# Patient Record
Sex: FEMALE | Race: BLACK OR AFRICAN AMERICAN | Employment: FULL TIME | URBAN - METROPOLITAN AREA
[De-identification: names, ages, dates, MRNs, and addresses within clinical notes are randomized per-mention and may not be internally consistent; named-entity substitution may affect disease eponyms.]

---

## 2020-10-30 ENCOUNTER — TRANSCRIBE ORDER (OUTPATIENT)
Dept: PHYSICAL THERAPY | Age: 50
End: 2020-10-30

## 2020-10-30 DIAGNOSIS — M54.2 CERVICALGIA: Primary | ICD-10-CM

## 2020-11-06 ENCOUNTER — HOSPITAL ENCOUNTER (OUTPATIENT)
Dept: PHYSICAL THERAPY | Age: 50
Discharge: HOME | End: 2020-11-06
Payer: COMMERCIAL

## 2020-11-06 PROCEDURE — 97110 THERAPEUTIC EXERCISES: CPT

## 2020-11-06 PROCEDURE — 97140 MANUAL THERAPY 1/> REGIONS: CPT

## 2020-11-06 PROCEDURE — 97162 PT EVAL MOD COMPLEX 30 MIN: CPT

## 2020-11-06 NOTE — PROGRESS NOTES
PT DAILY TREATMENT NOTE     Patient Name: Thanh Bo  Date:2020  : 1970  [x]  Patient  Verified  Payor: BLUE CROSS / Plan: [facility] PPO / Product Type: PPO /    In time: 434  Out time:515  Total Treatment Time (min): 41  Visit #: 1 of 8    Medicare/BCBS Only   Total Timed Codes (min):  26 1:1 Treatment Time:  41       Treatment Area: Cervicalgia [M54.2]    SUBJECTIVE  Pain Level (0-10 scale): 4  Any medication changes, allergies to medications, adverse drug reactions, diagnosis change, or new procedure performed?: [x] No    [] Yes (see summary sheet for update)  Subjective functional status/changes:   [] No changes reported  Patient reports her neck pain started on 2020 when she dropped something and reached forward to pick it up. She reports a burning sensation through the left cervical musculature and through the left UE. She reports difficulty with carrying objects on the left, \"pressure\" in the left neck, and performance of heavy household activity due to the pain. OBJECTIVE    15 min [x]Eval                  []Re-Eval       18 min Therapeutic Exercise:  [] See flow sheet: Patient provided printed HEP to begin addressing impairments. Demonstration of exercises provided with rationale for each. Patient education provided on importance of compliance to HEP for improved carry over between therapy sessions. Patient education for hot pack application for symptom management. Reviewed sleeping position and use of towel roll for cervical support to improve sleep. Rationale: increase ROM and increase strength to improve the patients ability to perform ADLs and self care tasks with greater ease     8 min Manual Therapy:  Seated - STM/DTM to left UT and levator scap; Supine - SOR, passive side flexion   The manual therapy interventions were performed at a separate and distinct time from the therapeutic activities interventions.   Rationale: decrease pain and increase tissue extensibility to perform overall functional mobility with greater ease           With   [] TE   [] TA   [] neuro   [] other: Patient Education: [x] Review HEP    [] Progressed/Changed HEP based on:   [] positioning   [] body mechanics   [] transfers   [] heat/ice application    [] other:      Other Objective/Functional Measures: see paper chart for evaluation form     Pain Level (0-10 scale) post treatment: 3-4    ASSESSMENT/Changes in Function: Patient is a 48year old female with acute onset of neck pain following episode of bending forward and reaching for object from floor. Patient reports no limitations in functional mobility prior to onset and is currently limited with driving, sleeping, ADL performance, and self care tasks due to neck and left UE pain. Patient presents with decreased cervical range of motion, left UE strength, (-) ULTT, poor postural awareness, no change with cervical traction/compression, and increased hypertonicity through cervical musculature (left > right). Patient would benefit from skilled PT to address impairments and improve overall functional mobility and ADL performance. Patient will continue to benefit from skilled PT services to modify and progress therapeutic interventions, address functional mobility deficits, address ROM deficits, address strength deficits, analyze and address soft tissue restrictions, analyze and cue movement patterns, analyze and modify body mechanics/ergonomics, assess and modify postural abnormalities, address imbalance/dizziness and instruct in home and community integration to attain remaining goals. [x]  See Plan of Care  []  See progress note/recertification  []  See Discharge Summary         Progress towards goals / Updated goals:  Short Term Goals: To be accomplished in 2 weeks:  1.  Patient will report performance of home exercise program 4 of 7 days in the next week demonstrating compliance to therapy program and progress towards independent management of symptoms. Eval: HEP provided     Long Term Goals: To be accomplished in 4 weeks:  1. Patient will report pain <3/10 with full work day showing improvements in functional mobility and return to prior level of function. Eval: 7/10 with full work day  2. Patient will improve left upper extremity strength grossly to 5-/5 for greater ease with lifting/carrying groceries and perform household cleaning such as vacuuming and sweeping. Eval: left UE grossly 4-/5  3. Patient will improve cervical range of motion to [facility] for greater ease with driving and reading. Eval: cervical extension 25 deg, right lateral flex 35 deg, left rot 65 deg  4. Patient will report improvement in headaches by 50% to promote return to prior level of function and increase quality of life. Eval: 0%  5. Patient will improve FOTO score to 64 to demonstrate return to prior level of function and to improve patients ability to perform household activities with greater ease. Eval: 50    PLAN  [x]  Upgrade activities as tolerated     []  Continue plan of care  []  Update interventions per flow sheet       []  Discharge due to:_  []  Other:_      Erasmo Grijalva PT, DPT 11/6/2020  5:19 PM    No future appointments.
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
## 2020-11-19 ENCOUNTER — APPOINTMENT (OUTPATIENT)
Dept: PHYSICAL THERAPY | Age: 50
End: 2020-11-19
Payer: COMMERCIAL

## 2020-11-24 ENCOUNTER — HOSPITAL ENCOUNTER (OUTPATIENT)
Dept: PHYSICAL THERAPY | Age: 50
Discharge: HOME | End: 2020-11-24
Payer: COMMERCIAL

## 2020-11-24 PROCEDURE — 97110 THERAPEUTIC EXERCISES: CPT

## 2020-11-24 PROCEDURE — 97140 MANUAL THERAPY 1/> REGIONS: CPT

## 2020-11-24 PROCEDURE — 97530 THERAPEUTIC ACTIVITIES: CPT

## 2020-11-24 NOTE — PROGRESS NOTES
PT DAILY TREATMENT NOTE     Patient Name: Michelle Hernandez  Date:2020  : 1970  [x]  Patient  Verified  Payor: BLUE CROSS / Plan: [employer] [facility] PPO / Product Type: PPO /    In time:5:15  Out time:6:16  Total Treatment Time (min): 60  Visit #: 2 of 8    Medicare/BCBS Only   Total Timed Codes (min):  60 1:1 Treatment Time:  52       Treatment Area: Cervicalgia [M54.2]    SUBJECTIVE  Pain Level (0-10 scale): 9  Any medication changes, allergies to medications, adverse drug reactions, diagnosis change, or new procedure performed?: [x] No    [] Yes (see summary sheet for update)  Subjective functional status/changes:   [] No changes reported  Pt reports her neck is tight and tense and she can feel numbness running down into the back of her elbow. OBJECTIVE    37 min Therapeutic Exercise:  [x] See flow sheet :   Rationale: increase ROM and increase strength to improve the patients ability to perform functional task with ease. 15 min Therapeutic Activity:  [x]  See flow sheet :   Rationale: increase strength and improve coordination  to improve the patients ability to tolerate ADL's.    8 min Manual Therapy:  Seated - STM/DTM to left UT and levator scap; Supine - SOR, passive side flexion. Right S/L scapular circles and clocks. The manual therapy interventions were performed at a separate and distinct time from the therapeutic activities interventions. Rationale: decrease pain, increase ROM, increase tissue extensibility and decrease trigger points to improve functional mobility with ADL's. With   [] TE   [] TA   [] neuro   [] other: Patient Education: [x] Review HEP    [] Progressed/Changed HEP based on:   [] positioning   [] body mechanics   [] transfers   [] heat/ice application    [] other:      Other Objective/Functional Measures: BP check due to pt reporting some increased anxiety during beginnign of therex.  /86 pulse 93    Pain Level (0-10 scale) post treatment: 1.5    ASSESSMENT/Changes in Function:   First f/u session with pt displaying fair tolerance to therex however BP taken following exercises with the T-bands due to pt having what she reports as a small anxiety attack. Pt reports she felt her BP was rising and noticed heart palpitations. PT had pt take seated rest break and checked her BP which was 129/86 with a pulse of 93. Upon finding out her BP pt seemed to calm down and was able to complete her therapy session. Small trp noted in the left UT during manual . Pt educated in the importance of proper posture and the use of a lumbar roll to help with improved posture. HEP reviewed with pt reporting understanding and compliance. Pt reports decreased pain and tension in the C/S and left shoulder following manual.     Patient will continue to benefit from skilled PT services to modify and progress therapeutic interventions, address functional mobility deficits, address ROM deficits, address strength deficits, analyze and address soft tissue restrictions, analyze and cue movement patterns, analyze and modify body mechanics/ergonomics and assess and modify postural abnormalities to attain remaining goals. [x]  See Plan of Care  []  See progress note/recertification  []  See Discharge Summary         Progress towards goals / Updated goals:  Short Term Goals: To be accomplished in 2 weeks:  1. Patient will report performance of home exercise program 4 of 7 days in the next week demonstrating compliance to therapy program and progress towards independent management of symptoms. Eval: HEP provided    Current: met 11/24/20 Pt reports understanding and compliance.     Long Term Goals: To be accomplished in 4 weeks:  1. Patient will report pain <3/10 with full work day showing improvements in functional mobility and return to prior level of function. Eval: 7/10 with full work day  2.  Patient will improve left upper extremity strength grossly to 5-/5 for greater ease with lifting/carrying groceries and perform household cleaning such as vacuuming and sweeping. Eval: left UE grossly 4-/5  3. Patient will improve cervical range of motion to [facility] for greater ease with driving and reading. Eval: cervical extension 25 deg, right lateral flex 35 deg, left rot 65 deg  4. Patient will report improvement in headaches by 50% to promote return to prior level of function and increase quality of life. Eval: 0%  5. Patient will improve FOTO score to 64 to demonstrate return to prior level of function and to improve patients ability to perform household activities with greater ease.               Eval: 50    PLAN  []  Upgrade activities as tolerated     [x]  Continue plan of care  []  Update interventions per flow sheet       []  Discharge due to:_  []  Other:_      Kvng Domingo PTA 11/24/2020  5:25 PM    Future Appointments   Date Time Provider Saritha Ayala   11/27/2020  5:15 PM Shamika Fall PTA [facility]   12/1/2020  5:15 PM Shamika Fall PTA [facility]   12/3/2020  5:00 PM Emerson Mcfadden [facility]   12/8/2020  5:15 PM Shamika Fall PTA [facility]   12/10/2020  5:00 PM Joan Moy [facility]

## 2020-11-27 ENCOUNTER — HOSPITAL ENCOUNTER (OUTPATIENT)
Dept: PHYSICAL THERAPY | Age: 50
Discharge: HOME | End: 2020-11-27
Payer: COMMERCIAL

## 2020-11-27 PROCEDURE — 97140 MANUAL THERAPY 1/> REGIONS: CPT

## 2020-11-27 PROCEDURE — 97110 THERAPEUTIC EXERCISES: CPT

## 2020-11-27 NOTE — PROGRESS NOTES
PT DAILY TREATMENT NOTE     Patient Name: Soledad Felder  Date:2020  : 1970  [x]  Patient  Verified  Payor: [employer] / Plan: [employer] [facility] PPO / Product Type: PPO /    In time:317  Out time: 348  Total Treatment Time (min): 31  Visit #: 3 of 8    Medicare/BCBS Only   Total Timed Codes (min):  31 1:1 Treatment Time:  31       Treatment Area: Cervicalgia [M54.2]    SUBJECTIVE  Pain Level (0-10 scale): 3  Any medication changes, allergies to medications, adverse drug reactions, diagnosis change, or new procedure performed?: [x] No    [] Yes (see summary sheet for update)  Subjective functional status/changes:   [] No changes reported  Patient reports she is feeling much better today than she did last session. OBJECTIVE    10 min Therapeutic Exercise:  [x] See flow sheet :   Rationale: increase ROM, increase strength and improve coordination to improve the patients ability to perform ADLs and self care tasks with greater ease     13 min Neuromuscular Reeducation:  [x]  See flow sheet: scapular stabilization with theraband, wall letters for scapular stabilization     Rationale: increase strength, improve coordination and increase proprioception  to improve the patients ability to perform functional tasks with improved stabilization     8 min Manual Therapy:  Supine - SOR, STM/DTM to left upper trap and levator scap   The manual therapy interventions were performed at a separate and distinct time from the therapeutic activities interventions.   Rationale: decrease pain, increase ROM, increase tissue extensibility and decrease trigger points to perform functional tasks with greater ease        With   [] TE   [] TA   [] neuro   [] other: Patient Education: [x] Review HEP    [] Progressed/Changed HEP based on:   [] positioning   [] body mechanics   [] transfers   [] heat/ice application    [] other:       Pain Level (0-10 scale) post treatment: 1.5    ASSESSMENT/Changes in Function: Abbreviated session today due to patient being 17 minutes late. Patient reporting some discomfort/pulling in the neck. Patient reporting relieved symptoms with hands interlaced behind neck for support. Patient able to tolerate addition of cervical rotation and nods with piliates ball without change in symptoms. Patient reports decrease in symptoms following manual therapy. Patient may benefit from electrical stimulation with ultrasound to target left UT next visit. Patient will continue to benefit from skilled PT services to modify and progress therapeutic interventions, address functional mobility deficits, address ROM deficits, address strength deficits, analyze and address soft tissue restrictions, analyze and cue movement patterns, analyze and modify body mechanics/ergonomics, assess and modify postural abnormalities, address imbalance/dizziness and instruct in home and community integration to attain remaining goals. [x]  See Plan of Care  []  See progress note/recertification  []  See Discharge Summary         Progress towards goals / Updated goals:  Short Term Goals: To be accomplished in 2 weeks:  1. Patient will report performance of home exercise program 4 of 7 days in the next week demonstrating compliance to therapy program and progress towards independent management of symptoms.             Eval: HEP provided               Current: met 11/24/20 Pt reports understanding and compliance.     Long Term Goals: To be accomplished in 4 weeks:  1. Patient will report pain <3/10 with full work day showing improvements in functional mobility and return to prior level of function.              Eval: 7/10 with full work day  2. Patient will improve left upper extremity strength grossly to 5-/5 for greater ease with lifting/carrying groceries and perform household cleaning such as vacuuming and sweeping.               Eval: left UE grossly 4-/5  3.  Patient will improve cervical range of motion to WFL for greater ease with driving and reading.               Eval: cervical extension 25 deg, right lateral flex 35 deg, left rot 65 deg  4. Patient will report improvement in headaches by 50% to promote return to prior level of function and increase quality of life.              Eval: 0%   Current: Progressing 11/27/2020 - patient reports not as frequent pressure headaches but did not give %  5.  Patient will improve FOTO score to 64 to demonstrate return to prior level of function and to improve patients ability to perform household activities with greater ease.              Eval: 50    PLAN  [x]  Upgrade activities as tolerated     []  Continue plan of care  []  Update interventions per flow sheet       []  Discharge due to:_  []  Other:_      Sang Scott, PT, DPT 11/27/2020  3:18 PM    Future Appointments   Date Time Provider Saritha Ayala   12/1/2020  5:15 PM Corinda Mean, PTA [facility]   12/3/2020  5:00 PM Dania Yu [facility]   12/8/2020  5:15 PM Corinda Mean, PTA [facility]   12/10/2020  5:00 PM Holger Gann [facility]

## 2020-12-01 ENCOUNTER — HOSPITAL ENCOUNTER (OUTPATIENT)
Dept: PHYSICAL THERAPY | Age: 50
Discharge: HOME | End: 2020-12-01
Payer: COMMERCIAL

## 2020-12-01 PROCEDURE — 97112 NEUROMUSCULAR REEDUCATION: CPT

## 2020-12-01 PROCEDURE — 97140 MANUAL THERAPY 1/> REGIONS: CPT

## 2020-12-01 PROCEDURE — 97110 THERAPEUTIC EXERCISES: CPT

## 2020-12-01 NOTE — PROGRESS NOTES
PT DAILY TREATMENT NOTE     Patient Name: Yanira Cao  Date:2020  : 1970  [x]  Patient  Verified  Payor: BLUE CROSS / [place] [facility] / Product Type: PPO /    In time:5:17  Out time:6:12  Total Treatment Time (min): 54  Visit #: 3 of 8    Medicare/BCBS Only   Total Timed Codes (min):  55 1:1 Treatment Time:  46       Treatment Area: Cervicalgia [M54.2]    SUBJECTIVE  Pain Level (0-10 scale): 3  Any medication changes, allergies to medications, adverse drug reactions, diagnosis change, or new procedure performed?: [x] No    [] Yes (see summary sheet for update)  Subjective functional status/changes:   [] No changes reported  Pt reports she is not having as much pain through out the day but is still having pain at night    OBJECTIVE    32 min Therapeutic Exercise:  [x] See flow sheet :   Rationale: increase ROM and increase strength to improve the patients ability to tolerate ADL's. 15 min Neuromuscular Re-education:  [x]  See flow sheet :   Rationale: increase strength, improve coordination and increase proprioception  to improve the patients ability to perform ADL's with ease. 8 min Manual Therapy:  S/L - STM/DTM to left UT and levator scap;  Right S/L scapular circles and clocks. The manual therapy interventions were performed at a separate and distinct time from the therapeutic activities interventions. Rationale: decrease pain, increase ROM, increase tissue extensibility and decrease trigger points to improve functional mobility with overhead reaching.            With   [] TE   [] TA   [] neuro   [] other: Patient Education: [x] Review HEP    [] Progressed/Changed HEP based on:   [] positioning   [] body mechanics   [] transfers   [] heat/ice application    [] other:      Other Objective/Functional Measures: cerv ext 45 deg, right lat flex 35 deg, left Rot 78 deg       Pain Level (0-10 scale) post treatment: 2    ASSESSMENT/Changes in Function: Continued Trp's in the left UT and along the vertebral border of the left scap however pt notes decreased tightness post manual. Pt reprots about  6/10 pain on average in her C/S and left shoulder and notes some discomfort starting to arise in her right shoulder as well. Pt does display improved C/S ROM with left rotation and cervical extension noting  minimal discomfort with testing. No change to right lateral flexion. Patient will continue to benefit from skilled PT services to modify and progress therapeutic interventions, address functional mobility deficits, address ROM deficits, address strength deficits, analyze and address soft tissue restrictions, analyze and cue movement patterns, analyze and modify body mechanics/ergonomics and assess and modify postural abnormalities to attain remaining goals. []  See Plan of Care  []  See progress note/recertification  []  See Discharge Summary         Progress towards goals / Updated goals:  Short Term Goals: To be accomplished in 2 weeks:  1. Patient will report performance of home exercise program 4 of 7 days in the next week demonstrating compliance to therapy program and progress towards independent management of symptoms.             Eval: HEP provided               AFNXFRP: met 11/24/20 Pt reports understanding and compliance.     Long Term Goals: To be accomplished in 4 weeks:  1. Patient will report pain <3/10 with full work day showing improvements in functional mobility and return to prior level of function.              Eval: 7/10 with full work day  2. Patient will improve left upper extremity strength grossly to 5-/5 for greater ease with lifting/carrying groceries and perform household cleaning such as vacuuming and sweeping.               Eval: left UE grossly 4-/5  3.  Patient will improve cervical range of motion to [facility] for greater ease with driving and reading.               Eval: cervical extension 25 deg, right lateral flex 35 deg, left rot 65 deg   Current: Progressing 12. 1.20 cerv ext 45 deg, right lat flex 35 deg, left Rot 78 deg  4. Patient will report improvement in headaches by 50% to promote return to prior level of function and increase quality of life.              Eval: 0%              Current: Progressing 11/27/2020 - patient reports not as frequent pressure headaches but did not give %  5.  Patient will improve FOTO score to 64 to demonstrate return to prior level of function and to improve patients ability to perform household activities with greater ease.              Eval: 50    PLAN  []  Upgrade activities as tolerated     [x]  Continue plan of care  []  Update interventions per flow sheet       []  Discharge due to:_  []  Other:_      Lizeth Oseguera PTA 12/1/2020  5:34 PM    Future Appointments   Date Time Provider Saritha Ayala   12/3/2020  5:00 PM Allyssa Anderson [facility]   12/8/2020  5:15 [facility]   12/10/2020  5:00 PM [facility]

## 2020-12-03 ENCOUNTER — HOSPITAL ENCOUNTER (OUTPATIENT)
Dept: PHYSICAL THERAPY | Age: 50
Discharge: HOME | End: 2020-12-03
Payer: COMMERCIAL

## 2020-12-03 PROCEDURE — 97112 NEUROMUSCULAR REEDUCATION: CPT

## 2020-12-03 PROCEDURE — 97110 THERAPEUTIC EXERCISES: CPT

## 2020-12-03 PROCEDURE — 97140 MANUAL THERAPY 1/> REGIONS: CPT

## 2020-12-03 NOTE — PROGRESS NOTES
PT DAILY TREATMENT NOTE     Patient Name: Bautista Braga  Date:12/3/2020  : 1970  [x]  Patient  Verified  Payor: BLUE CROSS / Plan: [facility] PPO / Product Type: PPO /    In time: 517 Out time: 606  Total Treatment Time (min): 49  Visit #: 4 of 8    Medicare/BCBS Only   Total Timed Codes (min):  39 1:1 Treatment Time:  39       Treatment Area: Cervicalgia [M54.2]    SUBJECTIVE  Pain Level (0-10 scale): 1.5  Any medication changes, allergies to medications, adverse drug reactions, diagnosis change, or new procedure performed?: [x] No    [] Yes (see summary sheet for update)  Subjective functional status/changes:   [] No changes reported  Patient reports overall she's doing pretty well. She usually feels better following her therapy sessions and it lasts \"a few days\". OBJECTIVE    18 min Therapeutic Exercise:  [x] See flow sheet: + Re-assessment of goals for PN   Rationale: increase ROM, increase strength and improve coordination to improve the patients ability to perform ADLs and self care tasks with greater ease      13 min Neuromuscular Re-education:  [x]  See flow sheet: scapular stabilization exercises with TB, wall scapular stabilization exercises    Rationale: increase strength, improve coordination and increase proprioception  to improve the patients ability to perform functional mobility with greater ease      8 min Manual Therapy:  Supine - SOR, gentle cervical traction, passive side bend to right with left UT STM   The manual therapy interventions were performed at a separate and distinct time from the therapeutic activities interventions.   Rationale: decrease pain, increase ROM, increase tissue extensibility and decrease trigger points to perform functional mobility with greater ease         With   [] TE   [] TA   [] neuro   [] other: Patient Education: [x] Review HEP    [] Progressed/Changed HEP based on:   [] positioning   [] body mechanics   [] transfers   [] heat/ice application    [] other:      Other Objective/Functional Measures: left shoulder flexion 4+/5, left shoulder abduction 4/5 slight discomfort, left shoulder internal rotation 4/5, left shoulder external rotation 4/5     Pain Level (0-10 scale) post treatment: 0    ASSESSMENT/Changes in Function: patient presents for progress note. She reports ~45% improvement in symptoms overall since start of therapy. Patient reports improved pain through cervical musculature but does report some discomfort still present into left UE. Patient has shown progress with cervical ROM, left shoulder strength, and improvement in tolerance to full work day. She would benefit from continued skilled PT to address limitations impacting functional mobility and help promote return to prior level of function. Patient will continue to benefit from skilled PT services to modify and progress therapeutic interventions, address functional mobility deficits, address ROM deficits, address strength deficits, analyze and address soft tissue restrictions, analyze and cue movement patterns, analyze and modify body mechanics/ergonomics, assess and modify postural abnormalities, address imbalance/dizziness and instruct in home and community integration to attain remaining goals. []  See Plan of Care  [x]  See progress note/recertification  []  See Discharge Summary         Progress towards goals / Updated goals:  Short Term Goals: To be accomplished in 2 weeks:  1. Patient will report performance of home exercise program 4 of 7 days in the next week demonstrating compliance to therapy program and progress towards independent management of symptoms.             Eval: HEP provided               ARIADNE: met 11/24/20 Pt reports understanding and compliance.     Long Term Goals: To be accomplished in 4 weeks:  1.  Patient will report pain <3/10 with full work day showing improvements in functional mobility and return to prior level of function.              Eval: 7/10 with full work day   Current: Progressing 12/3/2020 - 5/10 full work day  2. Patient will improve left upper extremity strength grossly to 5-/5 for greater ease with lifting/carrying groceries and perform household cleaning such as vacuuming and sweeping.               Eval: left UE grossly 4-/5   Current: Progressing 12/3/2020 - left shoulder UE grossly 4/5  3. Patient will improve cervical range of motion to [facility] for greater ease with driving and reading.               Eval: cervical extension 25 deg, right lateral flex 35 deg, left rot 65 deg              Current: Progressing 12/1/0 cervical ext 45 deg, right lat flex 35 deg, left Rot 78 deg  4. Patient will report improvement in headaches by 50% to promote return to prior level of function and increase quality of life.              Eval: 0%              Current: Progressing 11/27/2020 - patient reports not as frequent pressure headaches but did not give %  5.  Patient will improve FOTO score to 64 to demonstrate return to prior level of function and to improve patients ability to perform household activities with greater ease.              Eval: 50   Current: Progressing 12/3/2020 - 48    PLAN  [x]  Upgrade activities as tolerated     []  Continue plan of care  []  Update interventions per flow sheet       []  Discharge due to:_  []  Other:_      Ariella Zarco, PT, DPT 12/3/2020  5:24 PM    Future Appointments   Date Time Provider Saritha Ayala   12/8/2020  5:15 PM [facility]   12/10/2020  5:00 PM [facility]
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
## 2020-12-08 ENCOUNTER — APPOINTMENT (OUTPATIENT)
Dept: PHYSICAL THERAPY | Age: 50
End: 2020-12-08
Payer: COMMERCIAL

## 2020-12-10 ENCOUNTER — APPOINTMENT (OUTPATIENT)
Dept: PHYSICAL THERAPY | Age: 50
End: 2020-12-10
Payer: COMMERCIAL

## 2020-12-11 ENCOUNTER — APPOINTMENT (OUTPATIENT)
Dept: PHYSICAL THERAPY | Age: 50
End: 2020-12-11
Payer: COMMERCIAL

## 2020-12-15 ENCOUNTER — HOSPITAL ENCOUNTER (OUTPATIENT)
Dept: PHYSICAL THERAPY | Age: 50
Discharge: HOME | End: 2020-12-15
Payer: COMMERCIAL

## 2020-12-15 PROCEDURE — 97110 THERAPEUTIC EXERCISES: CPT

## 2020-12-15 PROCEDURE — 97140 MANUAL THERAPY 1/> REGIONS: CPT

## 2020-12-15 NOTE — PROGRESS NOTES
PT DAILY TREATMENT NOTE     Patient Name: Josep Lee  Date:12/15/2020  : 1970  [x]  Patient  Verified  Payor: BLUE CROSS / Plan: Leigh Ann MAO Prosper [id number] PPO / Product Type: PPO /    In time:5:28  Out time:6:03  Total Treatment Time (min): 35  Visit #: 1 of 8    Medicare/BCBS Only   Total Timed Codes (min):  35 1:1 Treatment Time:  30       Treatment Area: Cervicalgia [M54.2]    SUBJECTIVE  Pain Level (0-10 scale): 2  Any medication changes, allergies to medications, adverse drug reactions, diagnosis change, or new procedure performed?: [x] No    [] Yes (see summary sheet for update)  Subjective functional status/changes:   [] No changes reported  Pt reports she doesn't have a lot of pain anymore but she can just be sitting still and she can feel tingling and numbness into her left UE.    OBJECTIVE      27 min Therapeutic Exercise:  [x] See flow sheet :   Rationale: increase ROM, increase strength and improve coordination to improve the patients ability to perform daily task with ease. 8 min Manual Therapy:  S/L - STM/DTM to left UT and levator scap;  Right S/L scapular circles and clocks. SOR, gentle cervical traction, passive side bend to right with left UT STM   The manual therapy interventions were performed at a separate and distinct time from the therapeutic activities interventions. Rationale: decrease pain, increase ROM, increase tissue extensibility and decrease trigger points to improve functional mobility with ADL's. With   [] TE   [] TA   [] neuro   [] other: Patient Education: [x] Review HEP    [] Progressed/Changed HEP based on:   [] positioning   [] body mechanics   [] transfers   [] heat/ice application    [] other:      Other Objective/Functional Measures: Pt arrived 15mins late for treatment today.      Pain Level (0-10 scale) post treatment: 0    ASSESSMENT/Changes in Function:   Pt continues to display a Trp in the left UT noted during manual and inquired about dry needling to aid with decreasing her Trp and decreasing the symptoms into her left UE. Pt notes her pain has significantly decreased since [facility] but notes continued symptoms into her left UE mainly when at rest. Pt does continue to display rounded shoulders with sitting posture and notes some improvements in symptoms with posture correction. Pt reports she has not had any pressure headaches in a while and the headaches she does have are sinus related. No pain reported post treatment. Patient will continue to benefit from skilled PT services to modify and progress therapeutic interventions, address functional mobility deficits, address ROM deficits, address strength deficits, analyze and address soft tissue restrictions, analyze and cue movement patterns, analyze and modify body mechanics/ergonomics and assess and modify postural abnormalities to attain remaining goals. [x]  See Plan of Care  []  See progress note/recertification  []  See Discharge Summary         Progress towards goals / Updated goals:  Updated Goals: to be achieved in 4 weeks:  1. Patient will report pain <3/10 with full work day showing improvements in functional mobility and return to prior level of function.              PN - 5/10 full work day  2. Patient will improve left upper extremity strength grossly to 5-/5 for greater ease with lifting/carrying groceries and perform household cleaning such as vacuuming and sweeping.               PN - left shoulder UE grossly 4/5  3. Patient will improve cervical range of motion to [facility] for greater ease with driving and reading.               PN - cervical ext 45 deg, right lat flex 35 deg, left Rot 78 deg  4. Patient will report improvement in headaches by 50% to promote return to prior level of function and increase quality of life.              PN - patient reports not as frequent pressure headaches but did not give %   Current: Met 12/15/20 Pt reports no pressure headaches in a while.  Pt states just an occasional sinus headache.   5. Patient will improve FOTO score to 64 to demonstrate return to prior level of function and to improve patients ability to perform household activities with greater ease.              PN- 53    PLAN  []  Upgrade activities as tolerated     [x]  Continue plan of care  []  Update interventions per flow sheet       []  Discharge due to:_  []  Other:_      Kvng Domingo PTA 12/15/2020  5:33 PM    Future Appointments   Date Time Provider Saritha Castanedai   12/22/2020  8:15 AM Emerson Mcfadden [facility]   12/24/2020 10:00 AM Shamika Fall PTA [facility]   12/29/2020  8:15 AM Joan Moy [facility]   12/31/2020  8:30 AM Shamika Fall PTA [facility]

## 2020-12-22 ENCOUNTER — HOSPITAL ENCOUNTER (OUTPATIENT)
Dept: PHYSICAL THERAPY | Age: 50
Discharge: HOME | End: 2020-12-22
Payer: COMMERCIAL

## 2020-12-22 PROCEDURE — 97110 THERAPEUTIC EXERCISES: CPT

## 2020-12-22 PROCEDURE — 97140 MANUAL THERAPY 1/> REGIONS: CPT

## 2020-12-22 NOTE — PROGRESS NOTES
PT DAILY TREATMENT NOTE     Patient Name: Go Carpio  Date:2020  : 1970  [x]  Patient  Verified  Payor: BLUE CROSS / Plan: [facility] PPO / Product Type: PPO /    In time:824  Out time:900  Total Treatment Time (min): 36  Visit #: 2 of 8    Medicare/BCBS Only   Total Timed Codes (min):  36 1:1 Treatment Time:  36       Treatment Area: Cervicalgia [M54.2]    SUBJECTIVE  Pain Level (0-10 scale): 3  Any medication changes, allergies to medications, adverse drug reactions, diagnosis change, or new procedure performed?: [x] No    [] Yes (see summary sheet for update)  Subjective functional status/changes:   [] No changes reported  Patient denies much pain in the neck but does report \"pressure\" and discomfort in left shoulder/arm. OBJECTIVE    18 min Therapeutic Exercise:  [x] See flow sheet:   Rationale: increase ROM, increase strength and improve coordination to improve the patients ability to perform ADLs and self care tasks with greater ease      10 min Neuromuscular Re-education:  [x]? See flow sheet: / prone scapular stabilization exercises, scapular stabilization exercises with TB, SA punches   Rationale: increase strength, improve coordination and increase proprioception  to improve the patients ability to perform functional mobility with greater ease      8 min Manual Therapy:  Supine - SOR, gentle traction, passive side bend with left UT STM; Sitting - STM to left upper trap and rhomboids   The manual therapy interventions were performed at a separate and distinct time from the therapeutic activities interventions.   Rationale: decrease pain, increase tissue extensibility and decrease trigger points to perform functional tasks with greater ease           With   [] TE   [] TA   [] neuro   [] other: Patient Education: [x] Review HEP    [] Progressed/Changed HEP based on:   [] positioning   [] body mechanics   [] transfers   [] heat/ice application    [] other: Other Objective/Functional Measures: cervical extension 50 degrees, right lateral flexion 35 degrees, left rotation 80 degrees    Pain Level (0-10 scale) post treatment: 2 \"pressure\"     ASSESSMENT/Changes in Function: Patient arrived 10 minutes late for her appointment today limiting full therapy session. Progressed scapular stabilization exercises from wall to half prone to improve strength. Patient reporting slight paresthesias with bilateral shoulder extension in half prone, however reports dissipation following completion of set. Patient reporting pressure in left shoulder/neck following session but denies pain. Continue progressing patient as tolerated. Patient will continue to benefit from skilled PT services to modify and progress therapeutic interventions, address functional mobility deficits, address ROM deficits, address strength deficits, analyze and address soft tissue restrictions, analyze and cue movement patterns, analyze and modify body mechanics/ergonomics, assess and modify postural abnormalities, address imbalance/dizziness and instruct in home and community integration to attain remaining goals. [x]  See Plan of Care  []  See progress note/recertification  []  See Discharge Summary         Progress towards goals / Updated goals:  Updated Goals: to be achieved in 4 weeks:  1. Patient will report pain <3/10 with full work day showing improvements in functional mobility and return to prior level of function.              PN - 5/10 full work day  2. Patient will improve left upper extremity strength grossly to 5-/5 for greater ease with lifting/carrying groceries and perform household cleaning such as vacuuming and sweeping.               PN - left shoulder UE grossly 4/5  3. Patient will improve cervical range of motion to [facility] for greater ease with driving and reading.               PN - cervical ext 45 deg, right lat flex 35 deg, left Rot 78 deg   Current: Progressing 12/22/2020 -   4. Patient will report improvement in headaches by 50% to promote return to prior level of function and increase quality of life.              PN - patient reports not as frequent pressure headaches but did not give %              Current: Met 12/15/20 Pt reports no pressure headaches in a while. Pt states just an occasional sinus headache.   5. Patient will improve FOTO score to 64 to demonstrate return to prior level of function and to improve patients ability to perform household activities with greater ease.              PN- 53    PLAN  [x]  Upgrade activities as tolerated     []  Continue plan of care  []  Update interventions per flow sheet       []  Discharge due to:_  []  Other:_      Galo Cardona, PT, DPT 12/22/2020  8:23 AM    Future Appointments   Date Time Provider Saritha Ayala   12/24/2020 10:00 AM Madonna Pappas PTA [facility]   12/28/2020 10:00 AM [facility]   12/29/2020  8:15 AM Dariel Win [facility]   12/31/2020  8:30 AM Madonna Pappas PTA [facility]

## 2020-12-24 ENCOUNTER — APPOINTMENT (OUTPATIENT)
Dept: PHYSICAL THERAPY | Age: 50
End: 2020-12-24
Payer: COMMERCIAL

## 2020-12-29 ENCOUNTER — HOSPITAL ENCOUNTER (OUTPATIENT)
Dept: PHYSICAL THERAPY | Age: 50
Discharge: HOME | End: 2020-12-29
Payer: COMMERCIAL

## 2020-12-29 PROCEDURE — 97140 MANUAL THERAPY 1/> REGIONS: CPT

## 2020-12-29 PROCEDURE — 97112 NEUROMUSCULAR REEDUCATION: CPT

## 2020-12-29 PROCEDURE — 97110 THERAPEUTIC EXERCISES: CPT

## 2020-12-29 NOTE — PROGRESS NOTES
PT DAILY TREATMENT NOTE     Patient Name: Judith White  Date:2020  : 1970  [x]  Patient  Verified  Payor: BLUE CROSS / Plan: [facility] PPO / Product Type: PPO /    In time: 825  Out time:913  Total Treatment Time (min): 48  Visit #: 3 of 8    Medicare/BCBS Only   Total Timed Codes (min):  38 1:1 Treatment Time:  38       Treatment Area: Cervicalgia [M54.2]    SUBJECTIVE  Pain Level (0-10 scale): 1  Any medication changes, allergies to medications, adverse drug reactions, diagnosis change, or new procedure performed?: [x] No    [] Yes (see summary sheet for update)  Subjective functional status/changes:   [] No changes reported  Patient reports she was slightly sore after last session but reports her she is feeling better today.      OBJECTIVE      Modality rationale: decrease pain and increase tissue extensibility to improve the patients ability to perform functional mobility    Min Type Additional Details    [] Estim:  []Unatt       []IFC  []Premod                        []Other:  []w/ice   []w/heat  Position:  Location:    [] Estim: []Att    []TENS instruct  []NMES                    []Other:  []w/US   []w/ice   []w/heat  Position:  Location:    []  Traction: [] Cervical       []Lumbar                       [] Prone          []Supine                       []Intermittent   []Continuous Lbs:  [] before manual  [] after manual    []  Ultrasound: []Continuous   [] Pulsed                           []1MHz   []3MHz W/cm2:  Location:    []  Iontophoresis with dexamethasone         Location: [] Take home patch   [] In clinic   10 []  Ice     [x]  heat  []  Ice massage  []  Laser   []  Anodyne Position: seated  Location: left shoulder/neck    []  Laser with stim  []  Other:  Position:  Location:    []  Vasopneumatic Device Pressure:       [] lo [] med [] hi   Temperature: [] lo [] med [] hi   [x] Skin assessment post-treatment:  [x]intact []redness- no adverse reaction []redness – adverse reaction:     16 min Therapeutic Exercise:  [x] See flow sheet:   Rationale: increase ROM, increase strength and improve coordination to improve the patient’s ability to perform ADLs and self care tasks with greater ease     14 min Neuromuscular Re-education:  [x]  See flow sheet: 1/2 prone scapular stabilization exercises, scapular stabilization exercises with TB, SA punches, NITISH SA press     Rationale: increase strength, improve coordination and increase proprioception  to improve the patient’s ability to perform functional tasks with greater stabilization     8 min Manual Therapy:  Supine - SOR, gentle traction, right sidebend with over pressure to left shoulder for added stretch; Sitting - STM to left UT/levator scapulae   The manual therapy interventions were performed at a separate and distinct time from the therapeutic activities interventions.  Rationale: decrease pain, increase ROM and increase tissue extensibility to perform functional tasks with greater ease         With   [] TE   [] TA   [] neuro   [] other: Patient Education: [x] Review HEP    [] Progressed/Changed HEP based on:   [] positioning   [] body mechanics   [] transfers   [] heat/ice application    [] other:      Other Objective/Functional Measures: left shoulder flexion 5-/5, left shoulder abduction 4+/5, left shoulder internal rotation 4/5, left shoulder external rotation 4/5      Pain Level (0-10 scale) post treatment: 1 \"stiff\"     ASSESSMENT/Changes in Function: Limited progression of exercises due to patient arriving 10 minutes late for appointment. Patient challenged by addition of NITISH SA press and cat/camel, however denies pain with either exercise. Patient showing limited progress with left shoulder strength grossly, but does demonstrate slight improvement specifically with flexion and abduction.     Patient will continue to benefit from skilled PT services to modify and progress therapeutic interventions, address  functional mobility deficits, address ROM deficits, address strength deficits, analyze and address soft tissue restrictions, analyze and cue movement patterns, analyze and modify body mechanics/ergonomics, assess and modify postural abnormalities, address imbalance/dizziness and instruct in home and community integration to attain remaining goals. [x]  See Plan of Care  []  See progress note/recertification  []  See Discharge Summary         Progress towards goals / Updated goals:  Updated Goals: to be achieved in 4 weeks:  1. Patient will report pain <3/10 with full work day showing improvements in functional mobility and return to prior level of function.              PN - 5/10 full work day  2. Patient will improve left upper extremity strength grossly to 5-/5 for greater ease with lifting/carrying groceries and perform household cleaning such as vacuuming and sweeping.               PN - left shoulder UE grossly 4/5   Current: Progressing 12/29/2020 - left shoulder flexion 5-/5, left shoulder abduction 4+/5, left shoulder internal rotation 4/5, left shoulder external rotation 4/5    3. Patient will improve cervical range of motion to [facility] for greater ease with driving and reading.               PN - cervical ext 45 deg, right lat flex 35 deg, left Rot 78 deg              Current: Progressing 12/22/2020 - cervical extension 50 degrees, right lateral flexion 35 degrees, left rotation 80 degrees  4. Patient will report improvement in headaches by 50% to promote return to prior level of function and increase quality of life.              PN - patient reports not as frequent pressure headaches but did not give %              Current: Met 12/15/20 Pt reports no pressure headaches in a while. Pt states just an occasional sinus headache.   5. Patient will improve FOTO score to 64 to demonstrate return to prior level of function and to improve patients ability to perform household activities with greater ease.              PN- 48    PLAN  [x]  Upgrade activities as tolerated     []  Continue plan of care  []  Update interventions per flow sheet       []  Discharge due to:_  []  Other:_      Abby Ballesteros, PT, DPT 12/29/2020  8:25 AM    Future Appointments   Date Time Provider Saritha Ayala   12/31/2020  8:30 AM Sury Drought, PTA [facility]   1/4/2021  5:15 PM Sury Drought, PTA [facility]   1/6/2021  3:45 PM Roland Peña [facility]   1/12/2021  4:30 PM Sury Drought, PTA [facility]

## 2020-12-31 ENCOUNTER — HOSPITAL ENCOUNTER (OUTPATIENT)
Dept: PHYSICAL THERAPY | Age: 50
Discharge: HOME | End: 2020-12-31
Payer: COMMERCIAL

## 2020-12-31 PROCEDURE — 97140 MANUAL THERAPY 1/> REGIONS: CPT

## 2020-12-31 PROCEDURE — 97110 THERAPEUTIC EXERCISES: CPT

## 2020-12-31 NOTE — PROGRESS NOTES
PT DAILY TREATMENT NOTE     Patient Name: Елена Gutierrez  QCLV:  : 1970  [x]  Patient  Verified  Payor: BLUE CROSS / Plan: [facility] PPO / Product Type: PPO /    In time:8:38  Out time:9:29  Total Treatment Time (min): 51  Visit #: 4 of 8    Medicare/BCBS Only   Total Timed Codes (min):  41 1:1 Treatment Time:  34       Treatment Area: Cervicalgia [M54.2]    SUBJECTIVE  Pain Level (0-10 scale): 1  Any medication changes, allergies to medications, adverse drug reactions, diagnosis change, or new procedure performed?: [x] No    [] Yes (see summary sheet for update)  Subjective functional status/changes:   [] No changes reported  Pt reports she is not having a lot pain in her neck and left shoulder but continues to feel intermittent pressure and numbness throughout her day. OBJECTIVE    Modality rationale: decrease pain and increase tissue extensibility to improve the patients ability to perform ADL's with ease.    Min Type Additional Details    [] Estim:  []Unatt       []IFC  []Premod                        []Other:  []w/ice   []w/heat  Position:  Location:    [] Estim: []Att    []TENS instruct  []NMES                    []Other:  []w/US   []w/ice   []w/heat  Position:  Location:    []  Traction: [] Cervical       []Lumbar                       [] Prone          []Supine                       []Intermittent   []Continuous Lbs:  [] before manual  [] after manual    []  Ultrasound: []Continuous   [] Pulsed                           []1MHz   []3MHz W/cm2:  Location:    []  Iontophoresis with dexamethasone         Location: [] Take home patch   [] In clinic   10 []  Ice     [x]  heat  []  Ice massage  []  Laser   []  Anodyne Position:seated  Location: left shoulder    []  Laser with stim  []  Other:  Position:  Location:    []  Vasopneumatic Device Pressure:       [] lo [] med [] hi   Temperature: [] lo [] med [] hi   [] Skin assessment post-treatment:  []intact []redness- no adverse reaction    []redness  adverse reaction:         33 min Therapeutic Exercise:  [x] See flow sheet :   Rationale: increase ROM, increase strength and improve coordination to improve the patients ability to perform daily task with ease. 8 min Manual Therapy:  Supine - SOR, gentle traction, right sidebend with over pressure to left shoulder for added stretch;  STM to left UT/levator scapulae   The manual therapy interventions were performed at a separate and distinct time from the therapeutic activities interventions. Rationale: decrease pain, increase ROM, increase tissue extensibility and decrease trigger points to improve functional mobility with ADL's. With   [] TE   [] TA   [] neuro   [] other: Patient Education: [x] Review HEP    [] Progressed/Changed HEP based on:   [] positioning   [] body mechanics   [] transfers   [] heat/ice application    [] other:      Other Objective/Functional Measures:      Pain Level (0-10 scale) post treatment: 0    ASSESSMENT/Changes in Function:   Pt reports decreased pain in the left shoulder since [facility] however reports continued intermittent pressure and numbness with non specific movements. Progressed c/s exercises (rot/SB/nods) to AROM with emphasis on good posture. Pt tolerated progressed exercises well reporting no pressure or numbness with exercises. Some pressure noted with T-band ER that eased upon form correction. Continued trp in the left UT that continues to cause pt discomfort. No pain or pressure noted post treatment. Patient will continue to benefit from skilled PT services to modify and progress therapeutic interventions, address functional mobility deficits, address ROM deficits, address strength deficits, analyze and address soft tissue restrictions, analyze and cue movement patterns, analyze and modify body mechanics/ergonomics and assess and modify postural abnormalities to attain remaining goals.      [x]  See Plan of Care  []  See progress note/recertification  []  See Discharge Summary         Progress towards goals / Updated goals:  Updated Goals: to be achieved in 4 weeks:  1. Patient will report pain <3/10 with full work day showing improvements in functional mobility and return to prior level of function.              PN - 5/10 full work day  2. Patient will improve left upper extremity strength grossly to 5-/5 for greater ease with lifting/carrying groceries and perform household cleaning such as vacuuming and sweeping.               PN - left shoulder UE grossly 4/5              Current: Progressing 12/29/2020 - left shoulder flexion 5-/5, left shoulder abduction 4+/5, left shoulder internal rotation 4/5, left shoulder external rotation 4/5              3. Patient will improve cervical range of motion to [facility] for greater ease with driving and reading.               PN - cervical ext 45 deg, right lat flex 35 deg, left Rot 78 deg              Current: Progressing 12/22/2020 - cervical extension 50 degrees, right lateral flexion 35 degrees, left rotation 80 degrees  4. Patient will report improvement in headaches by 50% to promote return to prior level of function and increase quality of life.              PN - patient reports not as frequent pressure headaches but did not give %              Current: Met 12/15/20 Pt reports no pressure headaches in a while. Pt states just an occasional sinus headache.   5. Patient will improve FOTO score to 64 to demonstrate return to prior level of function and to improve patients ability to perform household activities with greater ease.              PN- 53    PLAN  []  Upgrade activities as tolerated     [x]  Continue plan of care  []  Update interventions per flow sheet       []  Discharge due to:_  []  Other:_      Uriel Daley, PTA 12/31/2020  8:37 AM    Future Appointments   Date Time Provider Saritha Ayala   1/4/2021  5:15 PM Elsa Quinteros PTA [facility]   1/6/2021  3:45 PM Wes Morales [facility]   1/12/2021  4:30 PM Lizet Smoker, PTA [facility]

## 2021-01-06 ENCOUNTER — HOSPITAL ENCOUNTER (OUTPATIENT)
Dept: PHYSICAL THERAPY | Age: 51
Discharge: HOME | End: 2021-01-06
Payer: COMMERCIAL

## 2021-01-06 PROCEDURE — 97110 THERAPEUTIC EXERCISES: CPT

## 2021-01-06 PROCEDURE — 97140 MANUAL THERAPY 1/> REGIONS: CPT
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
## 2021-01-06 NOTE — PROGRESS NOTES
PT DAILY TREATMENT NOTE     Patient Name: Primitivo Wakarusa  Date:2021  : 1970  [x]  Patient  Verified  Payor: BLUE CROSS / Plan: [facility] PPO / Product Type: PPO /    In time:355  Out time:443  Total Treatment Time (min): 48  Visit #: 5 of 8    Medicare/BCBS Only   Total Timed Codes (min):  38 1:1 Treatment Time:  38       Treatment Area: Cervicalgia [M54.2]    SUBJECTIVE  Pain Level (0-10 scale): 0  Any medication changes, allergies to medications, adverse drug reactions, diagnosis change, or new procedure performed?: [x] No    [] Yes (see summary sheet for update)  Subjective functional status/changes:   [] No changes reported  Patient reports her neck is feeling pretty good today and she has only had pain up to 3-4/10 out of the last week.      OBJECTIVE    Modality rationale: decrease pain and increase tissue extensibility to improve the patients ability to perform functional tasks with improved ability    Min Type Additional Details    [] Estim:  []Unatt       []IFC  []Premod                        []Other:  []w/ice   []w/heat  Position:  Location:    [] Estim: []Att    []TENS instruct  []NMES                    []Other:  []w/US   []w/ice   []w/heat  Position:  Location:    []  Traction: [] Cervical       []Lumbar                       [] Prone          []Supine                       []Intermittent   []Continuous Lbs:  [] before manual  [] after manual    []  Ultrasound: []Continuous   [] Pulsed                           []1MHz   []3MHz W/cm2:  Location:    []  Iontophoresis with dexamethasone         Location: [] Take home patch   [] In clinic   10 []  Ice     [x]  heat  []  Ice massage  []  Laser   []  Anodyne Position: seated   Location: left shoulder/cervical     []  Laser with stim  []  Other:  Position:  Location:    []  Vasopneumatic Device Pressure:       [] lo [] med [] hi   Temperature: [] lo [] med [] hi   [] Skin assessment post-treatment:  []intact []redness- no adverse reaction    []redness  adverse reaction:     30 min Therapeutic Exercise:  [x] See flow sheet: + reassessment of goals    Rationale: increase ROM, increase strength and improve coordination to improve the patients ability to perform ADLs and self care tasks with greater ease     8 min Manual Therapy:  Supine - SOR, gentle traction, passive side bend with overpressure to left shoulder; sitting - STM to left upper trap and levator scapulae    The manual therapy interventions were performed at a separate and distinct time from the therapeutic activities interventions. Rationale: decrease pain, increase ROM, increase tissue extensibility and decrease trigger points to perform functional tasks with improved posture and greater ease       With   [] TE   [] TA   [] neuro   [] other: Patient Education: [x] Review HEP    [] Progressed/Changed HEP based on:   [] positioning   [] body mechanics   [] transfers   [] heat/ice application    [] other:         Pain Level (0-10 scale) post treatment: 0    ASSESSMENT/Changes in Function: Patient presents for progress note today. Patient continuously arriving late for appointments limiting progression of exercises with therapy. Patient progressing towards long term goals and has met 1 long term goal of decreased headache symptoms. Patient demonstrating improved cervical AROM, left UE strength, and improved tolerance to full work day with less pain reported. Patient continues to be limited with looking overhead, carrying groceries, and prolonged positioning. Patient would benefit from continued skilled PT 1x/week for 4 more weeks to further address impairments and promote return to PLOF. Discussed with patient importance of attending to posture at work space and importance of compliance to HEP for further gains with therapy.      Patient will continue to benefit from skilled PT services to modify and progress therapeutic interventions, address functional mobility deficits, address ROM deficits, address strength deficits, analyze and address soft tissue restrictions, analyze and cue movement patterns, analyze and modify body mechanics/ergonomics, assess and modify postural abnormalities, address imbalance/dizziness and instruct in home and community integration to attain remaining goals. []  See Plan of Care  [x]  See progress note/recertification  []  See Discharge Summary         Progress towards goals / Updated goals:  Updated Goals: to be achieved in 4 weeks:  1. Patient will report pain <3/10 with full work day showing improvements in functional mobility and return to prior level of function.              PN - 5/10 full work day   Current: Progressing 1/6/2021 - 3-4/10   2. Patient will improve left upper extremity strength grossly to 5-/5 for greater ease with lifting/carrying groceries and perform household cleaning such as vacuuming and sweeping.               PN - left shoulder UE grossly 4/5              Current: Progressing 12/29/2020 - left shoulder flexion 5-/5, left shoulder abduction 4+/5, left shoulder internal rotation 4/5, left shoulder external rotation 4/5              3. Patient will improve cervical range of motion to [facility] for greater ease with driving and reading.               PN - cervical ext 45 deg, right lat flex 35 deg, left Rot 78 deg              Current: Progressing 12/22/2020 - cervical extension 50 degrees, right lateral flexion 35 degrees, left rotation 80 degrees  4. Patient will report improvement in headaches by 50% to promote return to prior level of function and increase quality of life.              PN - patient reports not as frequent pressure headaches but did not give %              Current: Met 12/15/20 - Pt reports no pressure headaches in a while. Pt states just an occasional sinus headache.   5. Patient will improve FOTO score to 64 to demonstrate return to prior level of function and to improve patients ability to perform household activities with greater ease.              PN- 53   Current: No change - 53    PLAN  [x]  Upgrade activities as tolerated     []  Continue plan of care  []  Update interventions per flow sheet       []  Discharge due to:_  []  Other:_      Diana Rahman PT, DPT 1/6/2021  4:02 PM    Future Appointments   Date Time Provider Saritha Ayala   1/12/2021  4:30 PM Yo Verma PTA [id number]

## 2021-01-12 ENCOUNTER — HOSPITAL ENCOUNTER (OUTPATIENT)
Dept: PHYSICAL THERAPY | Age: 51
Discharge: HOME | End: 2021-01-12
Payer: COMMERCIAL

## 2021-01-12 PROCEDURE — 97140 MANUAL THERAPY 1/> REGIONS: CPT

## 2021-01-12 PROCEDURE — 97110 THERAPEUTIC EXERCISES: CPT

## 2021-01-12 NOTE — PROGRESS NOTES
PT DAILY TREATMENT NOTE     Patient Name: Tova Skelton  Date:2021  : 1970  [x]  Patient  Verified  Payor: BLUE CROSS / Plan: [facility] PPO / Product Type: PPO /    In time:4:39  Out time:5:18  Total Treatment Time (min): 39  Visit #: 1 of 4    Medicare/BCBS Only   Total Timed Codes (min):  39 1:1 Treatment Time:  39       Treatment Area: Cervicalgia [M54.2]    SUBJECTIVE  Pain Level (0-10 scale): 0  Any medication changes, allergies to medications, adverse drug reactions, diagnosis change, or new procedure performed?: [x] No    [] Yes (see summary sheet for update)  Subjective functional status/changes:   [] No changes reported  Pt reports her neck is continuing to feel a lot better and she has not had the tingling sensation down in to her arm and hand in a few days. OBJECTIVE    31 min Therapeutic Exercise:  [x] See flow sheet :   Rationale: increase ROM and increase strength to improve the patients ability to perform daily task with ease. 8 min Manual Therapy:  Supine - SOR, gentle traction, passive side bend with overpressure to left shoulder; sitting - STM to left upper trap and levator scapulae    The manual therapy interventions were performed at a separate and distinct time from the therapeutic activities interventions.   Rationale: decrease pain, increase ROM, increase tissue extensibility and decrease trigger points to improve functional mobility with           With   [] TE   [] TA   [] neuro   [] other: Patient Education: [x] Review HEP    [] Progressed/Changed HEP based on:   [] positioning   [] body mechanics   [] transfers   [] heat/ice application    [] other:      Other Objective/Functional Measures:    B ER with TB- org 10x  Standing shoulder flex/scap- 10x ea  scap clocks- peach 10x ea  Cat camel- 5x5\" ea    Pain Level (0-10 scale) post treatment: 0    ASSESSMENT/Changes in Function:   Pt notes fatigue in the c/s and B shoulders with therex however able to complete the exercises as prescribed. Pt notes improved sleep at night noting she has not had to utilize her heating pad for her c/s over the past few days. Pt tolerated progressed therex well noting no increasing pain and displaying improved functional mobility. No pain reported post treatment. Patient will continue to benefit from skilled PT services to modify and progress therapeutic interventions, address functional mobility deficits, address ROM deficits, address strength deficits, analyze and address soft tissue restrictions, analyze and cue movement patterns, analyze and modify body mechanics/ergonomics and assess and modify postural abnormalities to attain remaining goals. [x]  See Plan of Care  []  See progress note/recertification  []  See Discharge Summary         Progress towards goals / Updated goals:  Updated Goals: to be achieved in 4 weeks:  1. Patient will report pain <3/10 with full work day showing improvements in functional mobility and return to prior level of function.              PN - 5/10 full work day              Current: Progressing 1/6/2021 - 3-4/10   2. Patient will improve left upper extremity strength grossly to 5-/5 for greater ease with lifting/carrying groceries and perform household cleaning such as vacuuming and sweeping.               PN - left shoulder UE grossly 4/5              Current: Progressing 12/29/2020 - left shoulder flexion 5-/5, left shoulder abduction 4+/5, left shoulder internal rotation 4/5, left shoulder external rotation 4/5              3. Patient will improve cervical range of motion to [facility] for greater ease with driving and reading.               PN - cervical ext 45 deg, right lat flex 35 deg, left Rot 78 deg              Current: Progressing 12/22/2020 - cervical extension 50 degrees, right lateral flexion 35 degrees, left rotation 80 degrees  4.  Patient will report improvement in headaches by 50% to promote return to prior level of function and increase quality of life.              PN - patient reports not as frequent pressure headaches but did not give %              Current: Met 12/15/20 - Pt reports no pressure headaches in a while. Pt states just an occasional sinus headache. 5. Patient will improve FOTO score to 64 to demonstrate return to prior level of function and to improve patients ability to perform household activities with greater ease.              PN- 53              Current: No change - 53    PLAN  []  Upgrade activities as tolerated     [x]  Continue plan of care  []  Update interventions per flow sheet       []  Discharge due to:_  []  Other:_      Mike Reynoso, CHARLY 1/12/2021  4:40 PM    No future appointments.

## 2021-01-19 ENCOUNTER — HOSPITAL ENCOUNTER (OUTPATIENT)
Dept: PHYSICAL THERAPY | Age: 51
Discharge: HOME | End: 2021-01-19
Payer: COMMERCIAL

## 2021-01-19 PROCEDURE — 97110 THERAPEUTIC EXERCISES: CPT

## 2021-01-19 PROCEDURE — 97140 MANUAL THERAPY 1/> REGIONS: CPT

## 2021-01-19 NOTE — PROGRESS NOTES
PT DAILY TREATMENT NOTE     Patient Name: Yesi Quick  Date:2021  : 1970  [x]  Patient  Verified  Payor: BLUE CROSS / Plan: [facility] PPO / Product Type: PPO /    In time:5:15  Out time:6:00  Total Treatment Time (min): 45  Visit #: 2 of 4    Medicare/BCBS Only   Total Timed Codes (min):  45 1:1 Treatment Time:  45       Treatment Area: Cervicalgia [M54.2]    SUBJECTIVE  Pain Level (0-10 scale): 0  Any medication changes, allergies to medications, adverse drug reactions, diagnosis change, or new procedure performed?: [x] No    [] Yes (see summary sheet for update)  Subjective functional status/changes:   [] No changes reported  No pain just feels stiff and weak. OBJECTIVE    37 min Therapeutic Exercise:  [x] See flow sheet :   Rationale: increase ROM and increase strength to improve the patients ability to perform daily task with ease. 8 min Manual Therapy:  Supine - SOR, gentle traction, passive side bend with overpressure to left shoulder; sitting - STM to left upper trap and levator scapulae    The manual therapy interventions were performed at a separate and distinct time from the therapeutic activities interventions. Rationale: decrease pain, increase ROM, increase tissue extensibility and decrease trigger points to improve functional mobility with ADL's. With   [] TE   [] TA   [] neuro   [] other: Patient Education: [x] Review HEP    [] Progressed/Changed HEP based on:   [] positioning   [] body mechanics   [] transfers   [] heat/ice application    [] other:      Other Objective/Functional Measures: scap clocks held due to pt reporting increased tightness and discomfort with exercise. QP UE alt- 10x ea    Pain Level (0-10 scale) post treatment: 0    ASSESSMENT/Changes in Function:   Pt progressed well with therapy reporting no pain with therex and reporting no pain in the c/s and left shoulder in and out of the clinic.  Pt reports some continued weakness in the right shoulder but notes improvements since [facility]. Pt to finish out remaining appointments to prepare for d/c. Patient will continue to benefit from skilled PT services to modify and progress therapeutic interventions, address functional mobility deficits, address ROM deficits, address strength deficits, analyze and address soft tissue restrictions, analyze and cue movement patterns, analyze and modify body mechanics/ergonomics and assess and modify postural abnormalities to attain remaining goals. [x]  See Plan of Care  []  See progress note/recertification  []  See Discharge Summary         Progress towards goals / Updated goals:  1. Patient will report pain <3/10 with full work day showing improvements in functional mobility and return to prior level of function.              PN - 5/10 full work day              Current: Progressing 1/6/2021 - 3-4/10   2. Patient will improve left upper extremity strength grossly to 5-/5 for greater ease with lifting/carrying groceries and perform household cleaning such as vacuuming and sweeping.               PN - left shoulder UE grossly 4/5              Current: Progressing 12/29/2020 - left shoulder flexion 5-/5, left shoulder abduction 4+/5, left shoulder internal rotation 4/5, left shoulder external rotation 4/5              3. Patient will improve cervical range of motion to [facility] for greater ease with driving and reading.               PN - cervical ext 45 deg, right lat flex 35 deg, left Rot 78 deg              Current: Progressing 12/22/2020 - cervical extension 50 degrees, right lateral flexion 35 degrees, left rotation 80 degrees  4. Patient will report improvement in headaches by 50% to promote return to prior level of function and increase quality of life.              PN - patient reports not as frequent pressure headaches but did not give %              Current: Met 12/15/20 - Pt reports no pressure headaches in a while.  Pt states just an occasional sinus headache.   5. Patient will improve FOTO score to 64 to demonstrate return to prior level of function and to improve patients ability to perform household activities with greater ease.              PN- 53              Current: No change - 53    PLAN  []  Upgrade activities as tolerated     [x]  Continue plan of care  []  Update interventions per flow sheet       []  Discharge due to:_  []  Other:_      Mike Reynoso PTA 1/19/2021  5:29 PM    Future Appointments   Date Time Provider Saritha Ayala   1/26/2021  5:15 PM Atif Tapia PTA [facility]   2/3/2021  5:15 PM You Ortiz [facility]

## 2021-01-26 ENCOUNTER — APPOINTMENT (OUTPATIENT)
Dept: PHYSICAL THERAPY | Age: 51
End: 2021-01-26
Payer: COMMERCIAL

## 2021-02-03 ENCOUNTER — HOSPITAL ENCOUNTER (OUTPATIENT)
Dept: PHYSICAL THERAPY | Age: 51
Discharge: HOME | End: 2021-02-03
Payer: COMMERCIAL

## 2021-02-03 PROCEDURE — 97110 THERAPEUTIC EXERCISES: CPT

## 2021-02-03 PROCEDURE — 97140 MANUAL THERAPY 1/> REGIONS: CPT

## 2021-02-03 NOTE — PROGRESS NOTES
Physical Therapy Discharge Instructions      [facility]  [address]  [place], [address]  [phone] fax    Patient: Prosper Ahuja  : 1970      Continue Home Exercise Program 1 times per day for 3 weeks, then decrease to 4 times per week      Continue with    [] Ice  as needed 2-3 times per day     [x] Heat           Follow up with MD:     [] Upon completion of therapy     [x] As needed      Recommendations:     [x]   Return to activity with home program    []   Return to activity with the following modifications:       []Post Rehab Program    []Join Independent aquatic program     []Return to/join local gym              Margarita Matute PT, DPT 2/3/2021 5:34 PM

## 2021-02-03 NOTE — PROGRESS NOTES
PT DAILY TREATMENT NOTE     Patient Name: Radha Lee  HKUL:[id number]  : 1970  [x]  Patient  Verified  Payor: BLUE CROSS / Plan: [facility] PPO / Product Type: PPO /    In time: 520  Out time:600   Total Treatment Time (min): 40  Visit #: 3 of 4    Medicare/BCBS Only   Total Timed Codes (min):  40 1:1 Treatment Time:  40       Treatment Area: Cervicalgia [M54.2]    SUBJECTIVE  Pain Level (0-10 scale): 0  Any medication changes, allergies to medications, adverse drug reactions, diagnosis change, or new procedure performed?: [x] No    [] Yes (see summary sheet for update)  Subjective functional status/changes:   [] No changes reported  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 Rationale: decrease pain, increase tissue extensibility and decrease trigger points to perform usual functional mobility with            With   [] TE   [] TA   [] neuro   [] other: Patient Education: [x] Review HEP    [] Progressed/Changed HEP based on:   [] positioning   [] body mechanics   [] transfers   [] heat/ice application    [] other:      Other Objective/Functional Measures: left shoulder flexion 5-/5, left shoulder abduction 5-/5, left shoulder internal rotation 5-/5, left shoulder external rotation 5-/5      Pain Level (0-10 scale) post treatment: 0    ASSESSMENT/Changes in Function: Patient presents for last visit under current plan of care.  She reports no discomfort/pain through cervical spine or left UE with full return to work which was a concern for her. She has met 3 of her 5 long term goals and demonstrates improved cervical AROM, left shoulder strength, and improved overall functional mobility as reported per Gary Oden. Patient provided updated HEP today to maintain gains made during therapy bout. She has no further questions at this time and is agreeable to discharge. []  See Plan of Care  []  See progress note/recertification  [x]  See Discharge Summary         Progress towards goals / Updated goals:  1. Patient will report pain <3/10 with full work day showing improvements in functional mobility and return to prior level of function.              PN - 5/10 full work day              Current: Met 2/3/2021 - no pain with full work day   2. Patient will improve left upper extremity strength grossly to 5-/5 for greater ease with lifting/carrying groceries and perform household cleaning such as vacuuming and sweeping.               PN - left shoulder UE grossly 4/5              Current: Met 2/3/2021 - left shoulder flexion 5-/5, left shoulder abduction 5-/5, left shoulder internal rotation 5-/5, left shoulder external rotation 5-/5              3. Patient will improve cervical range of motion to [facility] for greater ease with driving and reading.               PN - cervical ext 45 deg, right lat flex 35 deg, left Rot 78 deg              Current: Progressing 12/22/2020 - cervical extension 50 degrees, right lateral flexion 35 degrees, left rotation 80 degrees   4. Patient will report improvement in headaches by 50% to promote return to prior level of function and increase quality of life.              PN - patient reports not as frequent pressure headaches but did not give %              Current: Met 12/15/20 - Pt reports no pressure headaches in a while. Pt states just an occasional sinus headache.   5. Patient will improve FOTO score to 64 to demonstrate return to prior level of function and to improve patients ability to perform household activities with greater ease.              PN- 48              Current: Progressing 2/3/2021 - 61    PLAN  []  Upgrade activities as tolerated     []  Continue plan of care  []  Update interventions per flow sheet       [x]  Discharge due to: progressing/met long term goals   []  Other:_      Pablo Craig, PT, DPT 2/3/2021  5:31 PM    No future appointments.
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
## 2022-11-28 ENCOUNTER — HOSPITAL ENCOUNTER (OUTPATIENT)
Dept: PHYSICAL THERAPY | Age: 52
Discharge: HOME | End: 2022-11-28
Payer: COMMERCIAL

## 2022-11-28 PROCEDURE — 97162 PT EVAL MOD COMPLEX 30 MIN: CPT

## 2022-11-28 PROCEDURE — 97530 THERAPEUTIC ACTIVITIES: CPT

## 2022-11-28 NOTE — PROGRESS NOTES
PT DAILY TREATMENT NOTE/ EVAL    Patient Name: Yodit Flood  Date:2022  : 1970  [x]  Patient  Verified  Payor: BLUE CROSS / Plan: [facility] PPO / Product Type: PPO /    In time:1726 pm  Out time:1805 pm  Total Treatment Time (min): 39  Visit #: 1 of 8    Medicare/BCBS Only   Total Timed Codes (min):  10 1:1 Treatment Time:  39     Treatment Area: Spinal stenosis, cervical region [M48.02]    SUBJECTIVE  Pain Level (0-10 scale): 5-6  []constant []intermittent []improving []worsening []no change since onset    Any medication changes, allergies to medications, adverse drug reactions, diagnosis change, or new procedure performed?: See summary sheet. Subjective functional status/changes:     SEE POC    OBJECTIVE/EXAMINATION  SEE POC      29 min [x]Eval                  []Re-Eval       10 min Therapeutic Activity:  Patient/ Caregiver education and instruction: Diagnosis, prognosis, exercises, PT POC, seeking emergency care if pt experiences chest pressure, chest pain, shortness of breath, change in status, hot pack application 85-47 minutes with appropriate layering. Rationale: to improve the patients ability to adhere to HEP and therapy sessions for increased compliance when working toward therapy goals.          With   [] TE   [x] TA   [] neuro   [] other: Patient Education: [] Review HEP    [] Progressed/Changed HEP based on:   [] positioning   [] body mechanics   [] transfers   [] heat/ice application    [x] other: See above      Other Objective/Functional Measures: SEE POC      Other tests/comments: SEE POC       Pain Level (0-10 scale) post treatment: 5-6    ASSESSMENT/Changes in Function: SEE POC    Patient will benefit from skilled PT services to modify and progress therapeutic interventions, address functional mobility deficits, address ROM deficits, address strength deficits, analyze and address soft tissue restrictions, analyze and cue movement patterns, analyze and modify body mechanics/ergonomics, assess and modify postural abnormalities, address imbalance/dizziness and instruct in home and community integration to attain goals and maximize pt's functional status. [x]  See Plan of Care  []  See progress note/recertification  []  See Discharge Summary         Progress towards goals / Updated goals:  Goals established at time of evaluation     PLAN  []  Upgrade activities as tolerated     [x]  Continue plan of care  []  Update interventions per flow sheet       []  Discharge due to:_  [x]  Other: Pt will benefit from skilled OP PT 2 x a week for 4 weeks in order to address impairments and maximize functional status.      Erika Ash, PT 11/28/2022  7:24 PM        Future Appointments   Date Time Provider Saritha Ayala   12/5/2022  5:15 PM Nigel Vivas, PTA [facility]   12/7/2022  4:30 PM Nigel Vivas, PTA [facility]   12/12/2022  4:30 PM Roosevelt Godinez, PTA [facility]   12/14/2022  6:00 PM Roosevelt Godinez, PTA [facility]   12/20/2022  5:00 PM Nigel Vivas, PTA [facility]   12/22/2022  5:00 PM Chad Blackburn, PTA [facility]   12/27/2022  5:00 PM Nigel Vivas, PTA [facility]

## 2022-11-28 NOTE — PROGRESS NOTES
[facility]  [address]  [place], [address]  [phone] fax    Plan of Care/ Statement of Necessity for Physical Therapy Services    Patient name: Sri Mora Start of Care: 2022   Referral source: Pepe Fraser MD : 1970    Medical Diagnosis: Spinal stenosis, cervical region [M48.02]  Payor: BLUE CROSS / Plan: [employer] [id number] PPO / Product Type: PPO /  Onset Date:10/30/22    Treatment Diagnosis: neck pain   Prior Hospitalization: see medical history Provider#: [id number]   Medications: Verified on Patient summary List    Comorbidities: HBP and scoliosis    Prior Level of Function: History of neck pain. Pt is right handed. Functionally indep. 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 The Plan of Care and following information is based on the information from the initial evaluation. Assessment / key information:  Patient is a 46 y.o. yo female who presents to [facility] at [facility] with diagnosis of Spinal stenosis, cervical region [M48.02]. Patient reports chief complaint of neck pain. Mechanism of Injury: Woke up with neck pain, progressively got worse. Pain level is a 5-6(Current) and ranges from 2-3/10 to 10/10 which increases with \"at night\", decreases with in the mornings, pressure. Pt reports history of neck pain. Pt reports pain going down to left elbow. Denies numbness/tingling down UE's recently. Pt reports no recent MRI (last one ). Upon objective evaluation, the patient demonstrates the following impairments: patient demonstrates impaired and painful C/s AROM in extension, left rotation and right side-bending, postural deviations of rounded shoulders, impaired strength of Left UE grossly compared to right UE and scapular musculature bilaterally, and tenderness to palpation over left UT muscles, pain in left shoulder with left spurling's and decreased muscular flexibility.   Patient scored 25 on neck FOTO indicating very severe decreased function and quality of life. Functional limitations include pt reports pain with lifting, writing on board, looking up, turning, interrupted sleeping, sleeping with heating bad. Patient would benefit from Skilled OP PT to address these deficits for increased functional mobility and quality of life. Today's assessment is significant for the following functional and objective measures:   General Health:  Red Flags Indicated? [] Yes    [] No  [] Yes [x] No Recent weight change (If yes, due to dieting? [] Yes  [] No)   [] Yes [x] No Persistent cough  [x] Yes [] No Unremitting pain at night  [] Yes [x] No Dizziness  [] Yes [x] No Blurred vision  [] Yes [x] No Hands more cold or painful in cold weather  [x] Yes [] No Ringing in ears Comment: sometimes  [] Yes [x] No Difficulty swallowing  [] Yes [x] No Dysfunction of bowel or bladder  [] Yes [x] No Recent illness within past 3 weeks (i.e, cold, flu)  [] Yes [x] No Jaw pain  Postural deviations rounded shoulder posture .   C/S ROM:   Active Movements: [] N/A   [] Too acute   [] Other:  ROM AROM Comments:pain, area   Forward flexion 42    Extension 17  Pain    SB right 33    SB left  50    Rotation right 50    Rotation left 48      Strength (UE):   Right (/5) Left (/5)   GHJ   Flexion 4+ 4             Abduction 4+ 4+             Extension 4 4             ER 4+ 4             IR 4+ 4   Upper Trapezius  5 5   Middle/Lower Trap 3+/3 3/3   Elbow Flexion 5 4+   Elbow Extension 5 5    Strength NT NT       Special Tests:  Cervical:        Vertebral Artery:  [] R    [] L    [] +    [] -       Alar Ligament:   [] R    [] L    [] +    [] -       Transverse Lig: [] R    [] L    [] +    [] -       Spurling's:  [] R    [x] L    [x] +    [] -       Distraction:  [] R    [] L    [] +    [x] -       Compression:   [] R    [] L    [] +    [x] -    Deep neck flexor endurance: 42 seconds  Muscle Flexibility: [] N/A   Michael: [] WNL [] Tight    [] R    [] L   Upper Trap: [] WNL    [x] Tight    [] R    [x] L   Levator:               [] WNL    [x] Tight    [x] R    [x] L   Pect. Minor: [] WNL    [] slight Tight     R [x]   [x] L  Palpation:  [x] Min  [] Mod  [] Severe    Location: left upper trap  Negative TTP cervical paraspinals, suboccipitals   Sensation increased \"tender\" throughout left arm. Shoulder screen:   ROM:  [] Unable to assess at this time                                           AROM                                                           Right Left   Flexion WNL WNL slight pain at end range   Extension WNL WNL slight pain   Abd WNL WNL       Headaches: yes \"when it first started\", how often: mornings, location: temple, relieving factors: With medication    Soft tissue restrictions along rhomboids     Justification for Eval Code Complexity:  Patient History : mod  Examination see exam   Clinical Presentation: mod  Clinical Decision Making : FOTO : 25 /100      Evaluation Complexity History MEDIUM  Complexity : 1-2 comorbidities / personal factors will impact the outcome/ POC ; Examination MEDIUM Complexity : 3 Standardized tests and measures addressing body structure, function, activity limitation and / or participation in recreation  ;Presentation MEDIUM Complexity : Evolving with changing characteristics  ; Clinical Decision Making HIGH Complexity : FOTO score of 1- 25   Overall Complexity Rating: MEDIUM  Problem List: pain affecting function, decrease ROM, decrease strength, decrease ADL/ functional abilitiies, decrease activity tolerance, and decrease flexibility/ joint mobility   Treatment Plan may include any combination of the following: Therapeutic exercise, Neuromuscular reeducation, Manual therapy, Therapeutic activity, Self care/home management, Electric stim unattended , Ultrasound, Mechanical traction, Electric stim attended, and Other: cold and hot pack as needed   Patient / Family readiness to learn indicated by: trying to perform skills and interest  Persons(s) to be included in education: patient (P)  Barriers to Learning/Limitations: None  Actions taken: n/a  Patient Goal (s): range and motion, comfort sleeping, lifting  Patient Self Reported Health Status: fair  Rehabilitation Potential: good      Short Term Goals: To be accomplished in  1  weeks:  1) Pt will be independent with initial HEP  Status at last certification/assessment: established and reviewed  Long Term Goals: To be accomplished in  4  weeks:  1) Patient  will be independent  with comprehensive updated HEP to continue to manage care independently after discharge. Status at last note/certification: established and reviewed initial HEP  2) Increase FOTO score to 51 indicating improved function and quality of life. Status at last certification/assessment: 25  3) Patient to Improve C/S AROM right side-bending by at least 5 degrees to facilitate ADLs  Status at last certification/assessment: C/S ROM:   Active Movements: [] N/A   [] Too acute   [] Other:  ROM AROM Comments:pain, area   SB right 33    SB left  50      4 ) Patient to increase strength in bilat middle and lower trap to at least 3+/5 to facilitate improved scapular stability with functional lifting, carrying   Status at last certification/assessment:  Strength (UE):   Right (/5) Left (/5)   Middle/Lower Trap 3+/3 3/3      5) Patient will report 75% improvement in neck and left UE symptoms with lifting, sleeping. Status at last certification/assessment: n/a     Frequency / Duration: Patient to be seen 2 times per week for 4 weeks. All LTG as above will be assessed and updated every 10 visits or 30 days and progressed as needed     Patient/ Caregiver education and instruction: Diagnosis, prognosis, exercises, PT POC, seeking emergency care if pt experiences chest pressure, chest pain, shortness of breath, change in status, hot pack application 16-00 minutes with appropriate layering.     [x] Plan of care has been reviewed with PTA    Certification Period: n/a  Vaishnavi Dihel, PT 11/28/2022 7:20 PM     ________________________________________________________________________    I certify that the above Therapy Services are being furnished while the patient is under my care. I agree with the treatment plan and certify that this therapy is necessary.     [de-identified] Signature:____________________  Date:____________Time: _________     Juan Case MD  Please sign and return to [facility] [address] [place] [address] & [address]  [address] [place] [address], [facility] [address]  [phone] fax

## 2022-12-05 ENCOUNTER — HOSPITAL ENCOUNTER (OUTPATIENT)
Dept: PHYSICAL THERAPY | Age: 52
Discharge: HOME | End: 2022-12-05
Payer: COMMERCIAL

## 2022-12-05 PROCEDURE — 97140 MANUAL THERAPY 1/> REGIONS: CPT

## 2022-12-05 PROCEDURE — 97110 THERAPEUTIC EXERCISES: CPT

## 2022-12-05 PROCEDURE — 97112 NEUROMUSCULAR REEDUCATION: CPT

## 2022-12-05 NOTE — PROGRESS NOTES
PT DAILY TREATMENT NOTE     Patient Name: Demian Bolaños  Date:2022  : 1970  [x]  Patient  Verified  Payor: BLUE CROSS / Plan: [employer] [facility] [employer] [id number] PPO / Product Type: PPO /    In time:5:15  Out time:6:10  Total Treatment Time (min): 54  Visit #: 2 of 8    Medicare/BCBS Only   Total Timed Codes (min):  45 1:1 Treatment Time:  45       Treatment Area: Spinal stenosis, cervical region [M48.02]    SUBJECTIVE  Pain Level (0-10 scale): 2.5-3/10  Any medication changes, allergies to medications, adverse drug reactions, diagnosis change, or new procedure performed?: [x] No    [] Yes (see summary sheet for update)  Subjective functional status/changes:   [] No changes reported  Pt reports for the past month she has had only one or two days where she did not have to take pain meds for her neck. OBJECTIVE    Modality rationale: decrease pain and increase tissue extensibility to improve the patients ability to perform functional task with ease.    Min Type Additional Details    [] Estim:  []Unatt       []IFC  []Premod                        []Other:  []w/ice   []w/heat  Position:  Location:    [] Estim: []Att    []TENS instruct  []NMES                    []Other:  []w/US   []w/ice   []w/heat  Position:  Location:    []  Traction: [] Cervical       []Lumbar                       [] Prone          []Supine                       []Intermittent   []Continuous Lbs:  [] before manual  [] after manual    []  Ultrasound: []Continuous   [] Pulsed                           []1MHz   []3MHz W/cm2:  Location:    []  Iontophoresis with dexamethasone         Location: [] Take home patch   [] In clinic   10 []  Ice     [x]  heat  []  Ice massage  []  Laser   []  Anodyne Position:supine  Location: C/S    []  Laser with stim  []  Other:  Position:  Location:    []  Vasopneumatic Device    []  Right     []  Left  Pre-treatment girth:  Post-treatment girth:  Measured at (location):  Pressure:       [] lo [] med [] hi   Temperature: [] lo [] med [] hi   [x] Skin assessment post-treatment:  [x]intact []redness- no adverse reaction    []redness - adverse reaction:       29 min Therapeutic Exercise:  [x] See flow sheet :   Rationale: increase ROM and increase strength to improve the patients ability to perform functional task with ease. 8 min Neuromuscular Re-education:  [x]  See flow sheet :   Rationale: increase strength, improve coordination, and increase proprioception  to improve the patients ability to perform ADL's with ease. 8 min Manual Therapy: Supine- SOR, STM to left UT/LS and c/s paraspinals. The manual therapy interventions were performed at a separate and distinct time from the therapeutic activities interventions. Rationale: decrease pain, increase ROM, increase tissue extensibility, and decrease trigger points to improve functional mobility with ADL's. With   [] TE   [] TA   [] neuro   [] other: Patient Education: [x] Review HEP    [] Progressed/Changed HEP based on:   [] positioning   [] body mechanics   [] transfers   [] heat/ice application    [] other:      Other Objective/Functional Measures: initiated therex per flow sheet. Pain Level (0-10 scale) post treatment: 1.5-2/10    ASSESSMENT/Changes in Function:   First follow up session with pt putting forth a good effort with exercises and displaying a good tolerance. The pt reports increased pain in the left c/s and notes little to no pain in the right. Cueing required for form with most exercises. No significant pain increase with therex. Trp noted in the left C/S paraspinals during manual. HEP understanding and compliance reported. Decreased pain post treatment.     Patient will continue to benefit from skilled PT services to modify and progress therapeutic interventions, address functional mobility deficits, address ROM deficits, address strength deficits, analyze and address soft tissue restrictions, analyze and cue movement patterns, analyze and modify body mechanics/ergonomics, and assess and modify postural abnormalities to attain remaining goals. [x]  See Plan of Care  []  See progress note/recertification  []  See Discharge Summary         Progress towards goals / Updated goals:  Short Term Goals: To be accomplished in  1  weeks:  1) Pt will be independent with initial HEP  Status at last certification/assessment: established and reviewed  Current: met 12/5/22 Pt reports understanding and compliance. Long Term Goals: To be accomplished in  4  weeks:  1) Patient  will be independent  with comprehensive updated HEP to continue to manage care independently after discharge. Status at last note/certification: established and reviewed initial HEP  2) Increase FOTO score to 51 indicating improved function and quality of life. Status at last certification/assessment: 25  3) Patient to Improve C/S AROM right side-bending by at least 5 degrees to facilitate ADLs  Status at last certification/assessment: C/S ROM:   Active Movements: [] N/A   [] Too acute   [] Other:  ROM AROM Comments:pain, area   SB right 33     SB left  50        4 ) Patient to increase strength in bilat middle and lower trap to at least 3+/5 to facilitate improved scapular stability with functional lifting, carrying   Status at last certification/assessment:  Strength (UE):    Right (/5) Left (/5)   Middle/Lower Trap 3+/3 3/3      5) Patient will report 75% improvement in neck and left UE symptoms with lifting, sleeping.    Status at last certification/assessment: n/a       PLAN  []  Upgrade activities as tolerated     [x]  Continue plan of care  []  Update interventions per flow sheet       []  Discharge due to:_  []  Other:_      Greg Barragan, CHARLY 12/5/2022  5:15 PM    Future Appointments   Date Time Provider Saritha Ayala   12/7/2022  4:30 PM Latricia Rodríguez PTA [facility]   12/12/2022  4:30 PM Ashly Vitale [facility]   12/14/2022  6:00 PM Yanelis Charli Duenas, PTA [facility]   12/20/2022  5:00 PM Lisa Adkins, PTA [facility]   12/22/2022  5:00 PM Trino Dixon, PTA [facility]   12/27/2022  5:00 PM Lisa Adkins, PTA [facility]

## 2022-12-07 ENCOUNTER — TELEPHONE (OUTPATIENT)
Dept: PHYSICAL THERAPY | Age: 52
End: 2022-12-07

## 2022-12-07 ENCOUNTER — APPOINTMENT (OUTPATIENT)
Dept: PHYSICAL THERAPY | Age: 52
End: 2022-12-07
Payer: COMMERCIAL

## 2022-12-12 ENCOUNTER — TELEPHONE (OUTPATIENT)
Dept: PHYSICAL THERAPY | Age: 52
End: 2022-12-12

## 2022-12-14 ENCOUNTER — HOSPITAL ENCOUNTER (OUTPATIENT)
Dept: PHYSICAL THERAPY | Age: 52
Discharge: HOME | End: 2022-12-14
Payer: COMMERCIAL

## 2022-12-14 PROCEDURE — 97140 MANUAL THERAPY 1/> REGIONS: CPT

## 2022-12-14 PROCEDURE — 97110 THERAPEUTIC EXERCISES: CPT

## 2022-12-14 NOTE — PROGRESS NOTES
PT DAILY TREATMENT NOTE     Patient Name: Raphael Herrera  KNKX:[id number]  : 1970  [x]  Patient  Verified  Payor: BLUE CROSS / Plan: [employer] [id number] PPO / Product Type: PPO /    In time:502  Out time:550  Total Treatment Time (min): 48  Visit #: 3 of 8    Medicare/BCBS Only   Total Timed Codes (min):  38 1:1 Treatment Time:  38       Treatment Area: Spinal stenosis, cervical region [M48.02]    SUBJECTIVE  Pain Level (0-10 scale): 5  Any medication changes, allergies to medications, adverse drug reactions, diagnosis change, or new procedure performed?: [x] No    [] Yes (see summary sheet for update)  Subjective functional status/changes:   [] No changes reported  Patient reports that she is unsure of why her pain has elevated tonight. \"It just does that sometimes. \" She felt better after her first followup appt but it didn't last.     OBJECTIVE    Modality rationale: decrease pain and increase tissue extensibility to improve the patients ability to tolerate ADLs.     Min Type Additional Details    [] Estim:  []Unatt       []IFC  []Premod                        []Other:  []w/ice   []w/heat  Position:  Location:    [] Estim: []Att    []TENS instruct  []NMES                    []Other:  []w/US   []w/ice   []w/heat  Position:  Location:    []  Traction: [] Cervical       []Lumbar                       [] Prone          []Supine                       []Intermittent   []Continuous Lbs:  [] before manual  [] after manual    []  Ultrasound: []Continuous   [] Pulsed                           []1MHz   []3MHz W/cm2:  Location:    []  Iontophoresis with dexamethasone         Location: [] Take home patch   [] In clinic   10 []  Ice     [x]  heat  []  Ice massage  []  Laser   []  Anodyne Position: supine  Location: C/S    []  Laser with stim  []  Other:  Position:  Location:    []  Vasopneumatic Device    []  Right     []  Left  Pre-treatment girth:  Post-treatment girth:  Measured at (location): Pressure:       [] lo [] med [] hi   Temperature: [] lo [] med [] hi   [x] Skin assessment post-treatment:  [x]intact [x]redness- no adverse reaction    []redness - adverse reaction:         30 min Therapeutic Exercise:  [x] See flow sheet :   Rationale: increase ROM and increase strength to improve the patients ability to complete ADLs with ease. 8 min Manual Therapy:  IASTM/TPR to B UT/LS (L>R), left rhomboid, all with patient short sitting. The manual therapy interventions were performed at a separate and distinct time from the therapeutic activities interventions. Rationale: decrease pain, increase ROM, increase tissue extensibility, and decrease trigger points to improve functional mobility. With   [] TE   [] TA   [] neuro   [] other: Patient Education: [x] Review HEP    [] Progressed/Changed HEP based on:   [] positioning   [] body mechanics   [] transfers   [] heat/ice application    [] other:      Other Objective/Functional Measures:   -initiated I,T,Y  Exercises and chin tucks    Pain Level (0-10 scale) post treatment: 3    ASSESSMENT/Changes in Function: Today's session focused on increased cervical and scapular strength, mobility, and kinesthetic awareness. Patient exhibits increased forward head posture throughout activities and has been educated on importance of improved posture for reduced neck pain. She was able to complete 8 reps of chin tucks before halting exercise due to reported radicular pain into elbow of right UE. Continue to monitor radicular sx at next visit.      Patient will continue to benefit from skilled PT services to modify and progress therapeutic interventions, address functional mobility deficits, address ROM deficits, address strength deficits, analyze and address soft tissue restrictions, analyze and cue movement patterns, analyze and modify body mechanics/ergonomics, assess and modify postural abnormalities, and instruct in home and community integration to attain remaining goals. []  See Plan of Care  []  See progress note/recertification  []  See Discharge Summary         Progress towards goals / Updated goals:  Short Term Goals: To be accomplished in  1  weeks:  1) Pt will be independent with initial HEP  Status at last certification/assessment: established and reviewed  Current: met 12/5/22 Pt reports understanding and compliance. Long Term Goals: To be accomplished in  4  weeks:  1) Patient  will be independent  with comprehensive updated HEP to continue to manage care independently after discharge. Status at last note/certification: established and reviewed initial HEP    2) Increase FOTO score to 51 indicating improved function and quality of life. Status at last certification/assessment: 25    3) Patient to Improve C/S AROM right side-bending by at least 5 degrees to facilitate ADLs  Status at last certification/assessment: C/S ROM:   Active Movements: [] N/A   [] Too acute   [] Other:  ROM AROM Comments:pain, area   SB right 33     SB left  50      current: right SB= 39 degrees, left SB = 50 degrees 12/14/22    4 ) Patient to increase strength in bilat middle and lower trap to at least 3+/5 to facilitate improved scapular stability with functional lifting, carrying   Status at last certification/assessment:  Strength (UE):    Right (/5) Left (/5)   Middle/Lower Trap 3+/3 3/3    Current: initiated I,T,Y exercises 12/14/22    5) Patient will report 75% improvement in neck and left UE symptoms with lifting, sleeping.    Status at last certification/assessment: n/a   Current: patient reports 16% improvement 12/14/22    PLAN  [x]  Upgrade activities as tolerated     []  Continue plan of care  []  Update interventions per flow sheet       []  Discharge due to:_  []  Other:_      Bethany Parkinson PTA 12/14/2022  10:07 AM    Future Appointments   Date Time Provider Saritha Ayala   12/14/2022  5:00 PM Suman Ayala PTA [facility]   12/20/2022 5:00 PM Laci Brumfield, PTA [facility]   12/22/2022  5:00 PM Katrina Hernandez, PTA [facility]   12/27/2022  5:00 PM Laci Brumfield, PTA [facility]

## 2022-12-20 ENCOUNTER — HOSPITAL ENCOUNTER (OUTPATIENT)
Dept: PHYSICAL THERAPY | Age: 52
Discharge: HOME | End: 2022-12-20
Payer: COMMERCIAL

## 2022-12-20 PROCEDURE — 97140 MANUAL THERAPY 1/> REGIONS: CPT

## 2022-12-20 PROCEDURE — 97110 THERAPEUTIC EXERCISES: CPT

## 2022-12-20 NOTE — PROGRESS NOTES
PT DAILY TREATMENT NOTE     Patient Name: Mohsen Esquivel  GQAQ:  : 1970  [x]  Patient  Verified  Payor: BLUE CROSS / Plan: [facility] PPO / Product Type: PPO /    In time:5:12  Out time:5:54  Total Treatment Time (min): 42  Visit #: 4 of 8    Medicare/BCBS Only   Total Timed Codes (min):  32 1:1 Treatment Time:  32       Treatment Area: Spinal stenosis, cervical region [M48.02]    SUBJECTIVE  Pain Level (0-10 scale): 2  Any medication changes, allergies to medications, adverse drug reactions, diagnosis change, or new procedure performed?: [x] No    [] Yes (see summary sheet for update)  Subjective functional status/changes:   [] No changes reported  Pt reports se he thinks the cold has a lot to do with her pain and discomfort. OBJECTIVE    Modality rationale: decrease pain and increase tissue extensibility to improve the patients ability to perform ADL's with ease.    Min Type Additional Details    [] Estim:  []Unatt       []IFC  []Premod                        []Other:  []w/ice   []w/heat  Position:  Location:    [] Estim: []Att    []TENS instruct  []NMES                    []Other:  []w/US   []w/ice   []w/heat  Position:  Location:    []  Traction: [] Cervical       []Lumbar                       [] Prone          []Supine                       []Intermittent   []Continuous Lbs:  [] before manual  [] after manual    []  Ultrasound: []Continuous   [] Pulsed                           []1MHz   []3MHz W/cm2:  Location:    []  Iontophoresis with dexamethasone         Location: [] Take home patch   [] In clinic   10 []  Ice     [x]  heat  []  Ice massage  []  Laser   []  Anodyne Position:seated  Location: C/S    []  Laser with stim  []  Other:  Position:  Location:    []  Vasopneumatic Device    []  Right     []  Left  Pre-treatment girth:  Post-treatment girth:  Measured at (location):  Pressure:       [] lo [] med [] hi   Temperature: [] lo [] med [] hi   [x] Skin assessment post-treatment:  [x]intact []redness- no adverse reaction    []redness - adverse reaction:         26 min Therapeutic Exercise:  [x] See flow sheet :   Rationale: increase ROM, increase strength, and improve coordination to improve the patients ability to perform ADL's with ease. 6 min Manual Therapy:    IASTM/TPR to B UT/LS (L>R), left rhomboid, all with patient supine. The manual therapy interventions were performed at a separate and distinct time from the therapeutic activities interventions. Rationale: decrease pain, increase ROM, and increase tissue extensibility to improve functional mobility with ADL's. With   [] TE   [] TA   [] neuro   [] other: Patient Education: [x] Review HEP    [] Progressed/Changed HEP based on:   [] positioning   [] body mechanics   [] transfers   [] heat/ice application    [] other:      Other Objective/Functional Measures:      Pain Level (0-10 scale) post treatment: 1    ASSESSMENT/Changes in Function:   Continued mm restrictions noted in the B UT and C/S paraspinals (left > right). The pt reports her pain is sporadic with some days being more painful than others. Time constraints to therex due to pt's late arrival. Continue to progress therex to aid with decreased C/S mm restrictions, improved mobility and ease with everyday activities. Patient will continue to benefit from skilled PT services to modify and progress therapeutic interventions, address functional mobility deficits, address ROM deficits, address strength deficits, analyze and address soft tissue restrictions, analyze and cue movement patterns, analyze and modify body mechanics/ergonomics, and assess and modify postural abnormalities to attain remaining goals. []  See Plan of Care  []  See progress note/recertification  []  See Discharge Summary         Progress towards goals / Updated goals:  Short Term Goals:  To be accomplished in  1  weeks:  1) Pt will be independent with initial HEP  Status at last certification/assessment: established and reviewed  Current: met 12/5/22 Pt reports understanding and compliance. Long Term Goals: To be accomplished in  4  weeks:  1) Patient  will be independent  with comprehensive updated HEP to continue to manage care independently after discharge. Status at last note/certification: established and reviewed initial HEP  Current: progressing 12/20/22 HEP updated and reviewed and given to patient. 2) Increase FOTO score to 51 indicating improved function and quality of life. Status at last certification/assessment: 25     3) Patient to Improve C/S AROM right side-bending by at least 5 degrees to facilitate ADLs  Status at last certification/assessment: C/S ROM:   Active Movements: [] N/A   [] Too acute   [] Other:  ROM AROM Comments:pain, area   SB right 33     SB left  50      Current: right SB= 39 degrees, left SB = 50 degrees 12/14/22     4 ) Patient to increase strength in bilat middle and lower trap to at least 3+/5 to facilitate improved scapular stability with functional lifting, carrying   Status at last certification/assessment:  Strength (UE):    Right (/5) Left (/5)   Middle/Lower Trap 3+/3 3/3    Current: initiated I,T,Y exercises 12/14/22     5) Patient will report 75% improvement in neck and left UE symptoms with lifting, sleeping.    Status at last certification/assessment: n/a          Current: patient reports 16% improvement 12/14/22      PLAN  []  Upgrade activities as tolerated     [x]  Continue plan of care  []  Update interventions per flow sheet       []  Discharge due to:_  []  Other:_      Cris Garrison PTA 12/20/2022  5:15 PM    Future Appointments   Date Time Provider Saritha Ayala   12/22[facility]2022  5:00 PM [facility]   12/27/2022  5:00 PM [facility]ана Cohen[facility]

## 2022-12-22 ENCOUNTER — HOSPITAL ENCOUNTER (OUTPATIENT)
Dept: PHYSICAL THERAPY | Age: 52
Discharge: HOME | End: 2022-12-22
Payer: COMMERCIAL

## 2022-12-22 PROCEDURE — 97140 MANUAL THERAPY 1/> REGIONS: CPT

## 2022-12-22 PROCEDURE — 97110 THERAPEUTIC EXERCISES: CPT

## 2022-12-22 NOTE — PROGRESS NOTES
PT DAILY TREATMENT NOTE     Patient Name: Rick Miner  FQWK:  : 1970  [x]  Patient  Verified  Payor: BLUE CROSS / Plan: [facility] PPO / Product Type: PPO /    In time:4:01  Out time:4:49  Total Treatment Time (min): 48  Visit #: 5 of 8    Medicare/BCBS Only   Total Timed Codes (min):  38 1:1 Treatment Time:  38       Treatment Area: Spinal stenosis, cervical region [M48.02]    SUBJECTIVE  Pain Level (0-10 scale): 2/10  Any medication changes, allergies to medications, adverse drug reactions, diagnosis change, or new procedure performed?: [x] No    [] Yes (see summary sheet for update)  Subjective functional status/changes:   [] No changes reported  Pt reports no new complaints of pain. OBJECTIVE    Modality rationale: decrease inflammation and decrease pain to improve the patients ability to perform ADLs with increased ease.     Min Type Additional Details    [] Estim:  []Unatt       []IFC  []Premod                        []Other:  []w/ice   []w/heat  Position:  Location:    [] Estim: []Att    []TENS instruct  []NMES                    []Other:  []w/US   []w/ice   []w/heat  Position:  Location:    []  Traction: [] Cervical       []Lumbar                       [] Prone          []Supine                       []Intermittent   []Continuous Lbs:  [] before manual  [] after manual    []  Ultrasound: []Continuous   [] Pulsed                           []1MHz   []3MHz W/cm2:  Location:    []  Iontophoresis with dexamethasone         Location: [] Take home patch   [] In clinic   10 []  Ice     [x]  heat  []  Ice massage  []  Laser   []  Anodyne Position:supine  Location:c/s    []  Laser with stim  []  Other:  Position:  Location:    []  Vasopneumatic Device    []  Right     []  Left  Pre-treatment girth:  Post-treatment girth:  Measured at (location):  Pressure:       [] lo [] med [] hi   Temperature: [] lo [] med [] hi   [] Skin assessment post-treatment:  []intact []redness- no adverse reaction    []redness - adverse reaction:     28 min Therapeutic Exercise:  [x] See flow sheet :   Rationale: increase ROM and increase strength to improve the patients ability to perform ADLs with increased ease. 10 min Manual Therapy:  Pt in supine: SOR, TPR to left UT/LS. The manual therapy interventions were performed at a separate and distinct time from the therapeutic activities interventions. Rationale: decrease pain, increase ROM, increase tissue extensibility, correct positional vertigo, and decrease trigger points to improve functional mobility and reduce pan. With   [] TE   [] TA   [] neuro   [] other: Patient Education: [x] Review HEP    [] Progressed/Changed HEP based on:   [] positioning   [] body mechanics   [] transfers   [] heat/ice application    [] other:      Other Objective/Functional Measures: Held exercises as per flow sheet due to time constraints. Pain Level (0-10 scale) post treatment: 1.5/10    ASSESSMENT/Changes in Function: Pt demonstrates good response to care with decreased pain and soreness post manual treatment and MHP. Patient will continue to benefit from skilled PT services to modify and progress therapeutic interventions, address functional mobility deficits, address ROM deficits, address strength deficits, analyze and address soft tissue restrictions, analyze and cue movement patterns, and analyze and modify body mechanics/ergonomics to attain remaining goals. []  See Plan of Care  []  See progress note/recertification  []  See Discharge Summary         Progress towards goals / Updated goals:  Short Term Goals: To be accomplished in  1  weeks:  1) Pt will be independent with initial HEP  Status at last certification/assessment: established and reviewed  Current: met 12/5/22 Pt reports understanding and compliance. Long Term Goals:  To be accomplished in  4  weeks:  1) Patient  will be independent  with comprehensive updated HEP to continue to manage care independently after discharge. Status at last note/certification: established and reviewed initial HEP  Current: progressing 12/20/22 HEP updated and reviewed and given to patient. 2) Increase FOTO score to 51 indicating improved function and quality of life. Status at last certification/assessment: 25     3) Patient to Improve C/S AROM right side-bending by at least 5 degrees to facilitate ADLs  Status at last certification/assessment: C/S ROM:   Active Movements: [] N/A   [] Too acute   [] Other:  ROM AROM Comments:pain, area   SB right 33     SB left  50      Current: right SB= 39 degrees, left SB = 50 degrees 12/14/22     4 ) Patient to increase strength in bilat middle and lower trap to at least 3+/5 to facilitate improved scapular stability with functional lifting, carrying   Status at last certification/assessment:  Strength (UE):    Right (/5) Left (/5)   Middle/Lower Trap 3+/3 3/3    Current: initiated I,T,Y exercises 12/14/22     5) Patient will report 75% improvement in neck and left UE symptoms with lifting, sleeping.    Status at last certification/assessment: n/a          Current: patient reports 16% improvement 12/14/22            PLAN  []  Upgrade activities as tolerated     [x]  Continue plan of care  []  Update interventions per flow sheet       []  Discharge due to:_  []  Other:_      Fidencio Blank PTA 12/22/2022  4:11 PM    Future Appointments   Date Time Provider Saritha Ayala   12/27/2022  5:00 PM Arabella Harris PTA [facility]

## 2022-12-27 ENCOUNTER — HOSPITAL ENCOUNTER (OUTPATIENT)
Dept: PHYSICAL THERAPY | Age: 52
Discharge: HOME | End: 2022-12-27
Payer: COMMERCIAL

## 2022-12-27 PROCEDURE — 97140 MANUAL THERAPY 1/> REGIONS: CPT

## 2022-12-27 PROCEDURE — 97110 THERAPEUTIC EXERCISES: CPT

## 2022-12-27 PROCEDURE — 97112 NEUROMUSCULAR REEDUCATION: CPT

## 2022-12-27 NOTE — PROGRESS NOTES
PT DAILY TREATMENT NOTE     Patient Name: Samy Vega  QLBJ:[id number]  : 1970  [x]  Patient  Verified  Payor: BLUE CROSS / Plan: [facility] PPO / Product Type: PPO /    In time:5:04  Out time:5:57  Total Treatment Time (min): 48  Visit #: 6 of 8    Medicare/BCBS Only   Total Timed Codes (min):  43 1:1 Treatment Time:  43       Treatment Area: Spinal stenosis, cervical region [M48.02]    SUBJECTIVE  Pain Level (0-10 scale): 1.5  Any medication changes, allergies to medications, adverse drug reactions, diagnosis change, or new procedure performed?: [x] No    [] Yes (see summary sheet for update)  Subjective functional status/changes:   [] No changes reported  Pt reports her neck is feeling a lot better but she still has a knot in her UT that makes her arm tingle down into her elbow on occasions. OBJECTIVE    Modality rationale: decrease pain and increase tissue extensibility to improve the patients ability to perform ADL's with ease.    Min Type Additional Details    [] Estim:  []Unatt       []IFC  []Premod                        []Other:  []w/ice   []w/heat  Position:  Location:    [] Estim: []Att    []TENS instruct  []NMES                    []Other:  []w/US   []w/ice   []w/heat  Position:  Location:    []  Traction: [] Cervical       []Lumbar                       [] Prone          []Supine                       []Intermittent   []Continuous Lbs:  [] before manual  [] after manual    []  Ultrasound: []Continuous   [] Pulsed                           []1MHz   []3MHz W/cm2:  Location:    []  Iontophoresis with dexamethasone         Location: [] Take home patch   [] In clinic   10 []  Ice     [x]  heat  []  Ice massage  []  Laser   []  Anodyne Position:supine  Location: C/S    []  Laser with stim  []  Other:  Position:  Location:    []  Vasopneumatic Device    []  Right     []  Left  Pre-treatment girth:  Post-treatment girth:  Measured at (location):  Pressure:       [] lo [] med [] hi   Temperature: [] lo [] med [] hi   [x] Skin assessment post-treatment:  [x]intact []redness- no adverse reaction    []redness - adverse reaction:     27 min Therapeutic Exercise:  [x] See flow sheet :   Rationale: increase ROM, increase strength, and improve coordination to improve the patients ability to perform functional task with ease. 8 min Neuromuscular Re-education:  [x]  See flow sheet :   Rationale: increase strength, improve coordination, and increase proprioception  to improve the patients ability to perform ADL's with ease. 8 min Manual Therapy:  Pt in supine: SOR, TPR to left UT/LS. Right S/L- TPR to UT with active shoulder shrug. The manual therapy interventions were performed at a separate and distinct time from the therapeutic activities interventions. Rationale: decrease pain, increase ROM, increase tissue extensibility, and decrease trigger points to improve functional mobility with ADL's. With   [] TE   [] TA   [] neuro   [] other: Patient Education: [x] Review HEP    [] Progressed/Changed HEP based on:   [] positioning   [] body mechanics   [] transfers   [] heat/ice application    [] other:      Other Objective/Functional Measures:      Pain Level (0-10 scale) post treatment: 1-1.5/10    ASSESSMENT/Changes in Function:   Pt reports about a 40% improvement in her C/S since [facility]. Pt notes decreased pain in the left UT and reports improved left <> right c/s ROM. Although improved, the pt notes continued discomfort and tingling from the left UT and down into her left elbow. She reports a continued challenge with c/s ext and notes pain in the left shoulder and c/s with overhead reaching with the right UE. The pt is to benefit from continued treatment to decrease left UT trigger points and decrease tingling and numbness in the left UE to aid with ease of ADL's.      Patient will continue to benefit from skilled PT services to modify and progress therapeutic interventions, address functional mobility deficits, address ROM deficits, address strength deficits, analyze and address soft tissue restrictions, analyze and cue movement patterns, analyze and modify body mechanics/ergonomics, and assess and modify postural abnormalities to attain remaining goals. [x]  See Plan of Care  []  See progress note/recertification  []  See Discharge Summary         Progress towards goals / Updated goals:  Short Term Goals: To be accomplished in  1  weeks:  1) Pt will be independent with initial HEP  Status at last certification/assessment: established and reviewed  Current: met 12/5/22 Pt reports understanding and compliance. Long Term Goals: To be accomplished in  4  weeks:  1) Patient  will be independent  with comprehensive updated HEP to continue to manage care independently after discharge. Status at last note/certification: established and reviewed initial HEP  Current: progressing 12/20/22 HEP updated and reviewed and given to patient. 2) Increase FOTO score to 51 indicating improved function and quality of life. Status at last certification/assessment: 25  Current: progressing 12/27/22 FOTO score 29     3) Patient to Improve C/S AROM right side-bending by at least 5 degrees to facilitate ADLs  Status at last certification/assessment: C/S ROM:   Active Movements: [] N/A   [] Too acute   [] Other:  ROM AROM Comments:pain, area   SB right 33     SB left  50      Current: right SB= 39 degrees, left SB = 50 degrees 12/14/22     4 ) Patient to increase strength in bilat middle and lower trap to at least 3+/5 to facilitate improved scapular stability with functional lifting, carrying   Status at last certification/assessment:  Strength (UE):    Right (/5) Left (/5)   Middle/Lower Trap 3+/3 3/3    Current: initiated I,T,Y exercises 12/14/22     5) Patient will report 75% improvement in neck and left UE symptoms with lifting, sleeping.    Status at last certification/assessment: n/a          Current: patient reports 16% improvement 12/14/22         PLAN  []  Upgrade activities as tolerated     [x]  Continue plan of care  []  Update interventions per flow sheet       []  Discharge due to:_  []  Other:_      Johnathan Barnhart, PTA 12/27/2022  5:09 PM    No future appointments.

## 2022-12-28 NOTE — PROGRESS NOTES
[facility]  [address] [facility] [address]  [place], [address]  [phone] fax    Physical Therapy Progress Note  Patient name: Yodit Flood Start of Care: 2022   Referral source: Demetria Spurling, MD : 1970   Medical/Treatment Diagnosis: Spinal stenosis, cervical region [M48.02]  Payor: BLUE CROSS / Plan: [facility] PPO / Product Type: PPO /  Onset Date:10/30/22                 Prior Hospitalization: see medical history Provider#: [id number]   Medications: Verified on Patient Summary List    Comorbidities: HBP and scoliosis    Prior Level of Function: History of neck pain. Pt is right handed. Functionally indep. 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 Visits from Start of Care: 6    Missed Visits: 2    Progress towards goals / Updated goals:  Short Term Goals:   1) Pt will be independent with initial HEP  Status at last certification/assessment: established and reviewed  Current: met  Pt reports understanding and compliance. Long Term Goals:   1) Patient  will be independent  with comprehensive updated HEP to continue to manage care independently after discharge. Status at last note/certification: established and reviewed initial HEP  Current: progressing  HEP updated and reviewed and given to patient. 2) Increase FOTO score to 51 indicating improved function and quality of life.     Status at last certification/assessment: 25  Current: progressing  FOTO score 29     3) Patient to Improve C/S AROM right side-bending by at least 5 degrees to facilitate ADLs  Status at last certification/assessment: C/S ROM:   Active Movements: [] N/A   [] Too acute   [] Other:  ROM AROM Comments:pain, area   SB right 33     SB left  50      Current: Met right SB= 39 degrees, left SB = 50 degrees    4 ) Patient to increase strength in bilat middle and lower trap to at least 3+/5 to facilitate improved scapular stability with functional lifting, carrying Status at last certification/assessment:  Strength (UE):    Right (/5) Left (/5)   Middle/Lower Trap 3+/3 3/3    Current: initiated I,T,Y exercises ; goal in progress     5) Patient will report 75% improvement in neck and left UE symptoms with lifting, sleeping. Status at last certification/assessment: n/a          Current: patient reports 40% improvement, progressing        Updated Goals: to be achieved in 2-3 weeks:  1.) Patient  will be independent  with comprehensive updated HEP to continue to manage care independently after discharge. PN: progressing  HEP updated and reviewed and given to patient. current:   2.) Increase FOTO score to 51 indicating improved function and quality of life. PN: progressing  FOTO score 29  current:   3.) Patient to increase strength in bilat middle and lower trap to at least 3+/5 to facilitate improved scapular stability with functional lifting, carrying   PN: No change right mid/lower trap: 3+/3/5, left mid/lower trap: 3/3/5  current:    4.) Patient will report 75% improvement in neck and left UE symptoms with lifting, sleeping. PN: patient reports 40% improvement   current:     ASSESSMENT/RECOMMENDATIONS:  Pt reports about a 40% improvement in her C/S since [facility]. Pt notes decreased pain in the left UT and reports improved left <> right c/s ROM. Although improved, the pt notes continued discomfort and tingling from the left UT and down into her left elbow. She reports a continued challenge with c/s ext and notes pain in the left shoulder and c/s with overhead reaching with the right UE. The pt is to benefit from continued treatment to decrease left UT trigger points and decrease tingling and numbness in the left UE to aid with ease of ADL's.      [x]Continue therapy per initial plan/protocol at a frequency of  2 x per week for 2-3 weeks  []Continue therapy with the following recommended changes:_____________________ _____________________________________________________________________  []Discontinue therapy progressing towards or have reached established goals  []Discontinue therapy due to lack of appreciable progress towards goals  []Discontinue therapy due to lack of attendance or compliance  []Await Physician's recommendations/decisions regarding therapy  []Other:________________________________________________________________    Thank you for this referral.    Cris Garrison, PTA 12/28/2022 4:35 PM  Coretta Crocker, PT 12/28/22 5:10 PM   NOTE TO PHYSICIAN:  [address]   FAX TO [facility]: [phone]  If you are unable to process this request in 24 hours please contact our office: [phone]    I have read the above report and request that my patient continue as recommended. I have read the above report and request that my patient continue therapy with the following changes/special instructions:__________________________________________________________  I have read the above report and request that my patient be discharged from therapy.     Physicians signature: ______________________________Date: ______Time:______     Karlene Franklin MD

## 2023-01-03 ENCOUNTER — HOSPITAL ENCOUNTER (OUTPATIENT)
Dept: PHYSICAL THERAPY | Age: 53
Discharge: HOME | End: 2023-01-03
Payer: COMMERCIAL

## 2023-01-03 PROCEDURE — 97140 MANUAL THERAPY 1/> REGIONS: CPT

## 2023-01-03 PROCEDURE — 97110 THERAPEUTIC EXERCISES: CPT

## 2023-01-03 NOTE — PROGRESS NOTES
PT DAILY TREATMENT NOTE     Patient Name: Prosper Ahuja  QSWH:[id number]  : 1970  [x]  Patient  Verified  Payor: BLUE CROSS / Plan: [facility] PPO / Product Type: PPO /    In time:4:04  Out time:4:48  Total Treatment Time (min): 44  Visit #: 1 of -6    Medicare/BCBS Only   Total Timed Codes (min):  34 1:1 Treatment Time:  27       Treatment Area: Spinal stenosis, cervical region [M48.02]    SUBJECTIVE  Pain Level (0-10 scale): 3.5/10  Any medication changes, allergies to medications, adverse drug reactions, diagnosis change, or new procedure performed?: [x] No    [] Yes (see summary sheet for update)  Subjective functional status/changes:   [] No changes reported  Pt reports its been about 2.5 weeks since she has had to take any pain meds but she continues to have muscle spasms especially noted when she gets cold. OBJECTIVE    Modality rationale: decrease pain and increase tissue extensibility to improve the patients ability to perform ADL's.    Min Type Additional Details    [] Estim:  []Unatt       []IFC  []Premod                        []Other:  []w/ice   []w/heat  Position:  Location:    [] Estim: []Att    []TENS instruct  []NMES                    []Other:  []w/US   []w/ice   []w/heat  Position:  Location:    []  Traction: [] Cervical       []Lumbar                       [] Prone          []Supine                       []Intermittent   []Continuous Lbs:  [] before manual  [] after manual    []  Ultrasound: []Continuous   [] Pulsed                           []1MHz   []3MHz W/cm2:  Location:    []  Iontophoresis with dexamethasone         Location: [] Take home patch   [] In clinic   10 []  Ice     [x]  heat  []  Ice massage  []  Laser   []  Anodyne Position:supine w/wedge  Location: left shoulder    []  Laser with stim  []  Other:  Position:  Location:    []  Vasopneumatic Device    []  Right     []  Left  Pre-treatment girth:  Post-treatment girth:  Measured at (location): Pressure:       [] lo [] med [] hi   Temperature: [] lo [] med [] hi   [x] Skin assessment post-treatment:  [x]intact []redness- no adverse reaction    []redness - adverse reaction:         26 min Therapeutic Exercise:  [x] See flow sheet :   Rationale: increase ROM, increase strength, and improve coordination to improve the patients ability to perform functional task with ease. 8 min Manual Therapy:   Pt in supine: SOR, TPR to left UT/LS. Right S/L- TPR to UT with active shoulder shrug. The manual therapy interventions were performed at a separate and distinct time from the therapeutic activities interventions. Rationale: decrease pain, increase ROM, and increase tissue extensibility to improve functional mobility with ADL's. With   [] TE   [] TA   [] neuro   [] other: Patient Education: [x] Review HEP    [] Progressed/Changed HEP based on:   [] positioning   [] body mechanics   [] transfers   [] heat/ice application    [] other:      Other Objective/Functional Measures:   Open books- 10x3\"     Pain Level (0-10 scale) post treatment: 2    ASSESSMENT/Changes in Function:   The pt continues to notes intermittent tingling into the left UE especially noted with laying supine. Symptoms decrease with placing a towel under the arm for support. The pt does note improvements in the C/S and left UE reporting she has not taken any pain meds for about 2.5 weeks. The pt continues to display minor mm restrictions in the left UT but overall notes significant improvements since [facility]. Decreased pain reported post treatment. Continued treatment to decrease mm restrictions, improve posture and aid with ease of overhead reaching ADL's.     Patient will continue to benefit from skilled PT services to modify and progress therapeutic interventions, address functional mobility deficits, address ROM deficits, address strength deficits, analyze and address soft tissue restrictions, analyze and cue movement patterns, analyze and modify body mechanics/ergonomics, and assess and modify postural abnormalities to attain remaining goals. [x]  See Plan of Care  []  See progress note/recertification  []  See Discharge Summary         Progress towards goals / Updated goals:  Updated Goals: to be achieved in 2-3 weeks:  1.) Patient  will be independent  with comprehensive updated HEP to continue to manage care independently after discharge. PN: progressing  HEP updated and reviewed and given to patient. current:   2.) Increase FOTO score to 51 indicating improved function and quality of life. PN: progressing  FOTO score 29  current:   3.) Patient to increase strength in bilat middle and lower trap to at least 3+/5 to facilitate improved scapular stability with functional lifting, carrying   PN: No change right mid/lower trap: 3+/3/5, left mid/lower trap: 3/3/5  current:    4.) Patient will report 75% improvement in neck and left UE symptoms with lifting, sleeping.           PN: patient reports 40% improvement   current:     PLAN  []  Upgrade activities as tolerated     [x]  Continue plan of care  []  Update interventions per flow sheet       []  Discharge due to:_  []  Other:_      Shaun Amaya, PTA 1/3/2023  4:08 PM    Future Appointments   Date Time Provider Saritha Ayala   1/10/2023  5:30 PM Edu Carbo, PTA [facility]   1/16/2023  5:00 PM Edu Carbo, PTA [facility]   1/18/2023  4:30 PM Edu Carbo, PTA [facility]

## 2023-01-10 ENCOUNTER — HOSPITAL ENCOUNTER (OUTPATIENT)
Dept: PHYSICAL THERAPY | Age: 53
Discharge: HOME | End: 2023-01-10
Payer: COMMERCIAL

## 2023-01-10 PROCEDURE — 97110 THERAPEUTIC EXERCISES: CPT

## 2023-01-10 PROCEDURE — 97140 MANUAL THERAPY 1/> REGIONS: CPT

## 2023-01-10 PROCEDURE — 97112 NEUROMUSCULAR REEDUCATION: CPT

## 2023-01-10 NOTE — PROGRESS NOTES
PT DAILY TREATMENT NOTE     Patient Name: Pastor Salcedo  Date:1/10/2023  : 1970  [x]  Patient  Verified  Payor: BLUE CROSS / Plan: [facility] PPO / Product Type: PPO /    In time:5:30  Out time:6:27  Total Treatment Time (min): 62  Visit #: 2 of 4-6    Medicare/BCBS Only   Total Timed Codes (min):  47 1:1 Treatment Time:  47       Treatment Area: Spinal stenosis, cervical region [M48.02]    SUBJECTIVE  Pain Level (0-10 scale): 2.5  Any medication changes, allergies to medications, adverse drug reactions, diagnosis change, or new procedure performed?: [x] No    [] Yes (see summary sheet for update)  Subjective functional status/changes:   [] No changes reported  Pt reports the cold really seems to irritate her neck and shoulder causing her increased discomfort. Pt states although she doesn't have a lot of pain in her neck it does ache and that causes her muscles to get tired. OBJECTIVE    Modality rationale: decrease pain and increase tissue extensibility to improve the patients ability to perform ADL's.    Min Type Additional Details    [] Estim:  []Unatt       []IFC  []Premod                        []Other:  []w/ice   []w/heat  Position:  Location:    [] Estim: []Att    []TENS instruct  []NMES                    []Other:  []w/US   []w/ice   []w/heat  Position:  Location:    []  Traction: [] Cervical       []Lumbar                       [] Prone          []Supine                       []Intermittent   []Continuous Lbs:  [] before manual  [] after manual    []  Ultrasound: []Continuous   [] Pulsed                           []1MHz   []3MHz W/cm2:  Location:    []  Iontophoresis with dexamethasone         Location: [] Take home patch   [] In clinic   10 []  Ice     [x]  heat  []  Ice massage  []  Laser   []  Anodyne Position:supine w/wedge  Location: left shoulder, C/S    []  Laser with stim  []  Other:  Position:  Location:    []  Vasopneumatic Device    []  Right     [] Left  Pre-treatment girth:  Post-treatment girth:  Measured at (location):  Pressure:       [] lo [] med [] hi   Temperature: [] lo [] med [] hi   [x] Skin assessment post-treatment:  [x]intact []redness- no adverse reaction    []redness - adverse reaction:         31 min Therapeutic Exercise:  [x] See flow sheet :   Rationale: increase ROM, increase strength, and improve coordination to improve the patients ability to perform functional task with ease. 8 min Therapeutic Activity:  [x]  See flow sheet :   Rationale: increase strength, improve coordination, and increase proprioception  to improve the patients ability to perform daily task with ease. 8 min Manual Therapy:  Pt in supine: SOR, TPR to left UT/LS. Right S/L- TPR to UT with active shoulder shrug. The manual therapy interventions were performed at a separate and distinct time from the therapeutic activities interventions. Rationale: decrease pain, increase ROM, increase tissue extensibility, and decrease trigger points to improve functional mobility with ADL's. With   [] TE   [] TA   [] neuro   [] other: Patient Education: [x] Review HEP    [] Progressed/Changed HEP based on:   [] positioning   [] body mechanics   [] transfers   [] heat/ice application    [] other:      Other Objective/Functional Measures:      Pain Level (0-10 scale) post treatment: 1    ASSESSMENT/Changes in Function:  Trp's remain in the left C/S and along the left scapular area. The pt notes her pain is better but it tends to increase with cold temps. Plan to initiate ball on the wall, scapular vectors NV to continue to strengthen the B scaps, left shoulder and to improve c/s mobility to aid with ease of ADL's.      Patient will continue to benefit from skilled PT services to modify and progress therapeutic interventions, address functional mobility deficits, address ROM deficits, address strength deficits, analyze and address soft tissue restrictions, analyze and cue movement patterns, analyze and modify body mechanics/ergonomics, and assess and modify postural abnormalities to attain remaining goals. [x]  See Plan of Care  []  See progress note/recertification  []  See Discharge Summary         Progress towards goals / Updated goals:  Updated Goals: to be achieved in 2-3 weeks:  1.) Patient  will be independent  with comprehensive updated HEP to continue to manage care independently after discharge. PN: progressing  HEP updated and reviewed and given to patient. current:   2.) Increase FOTO score to 51 indicating improved function and quality of life. PN: progressing  FOTO score 29  current:   3.) Patient to increase strength in bilat middle and lower trap to at least 3+/5 to facilitate improved scapular stability with functional lifting, carrying   PN: No change right mid/lower trap: 3+/3/5, left mid/lower trap: 3/3/5  current:    4.) Patient will report 75% improvement in neck and left UE symptoms with lifting, sleeping.           PN: patient reports 40% improvement   current:     PLAN  []  Upgrade activities as tolerated     [x]  Continue plan of care  []  Update interventions per flow sheet       []  Discharge due to:_  []  Other:_      Kvng Power PTA 1/10/2023  5:47 PM    Future Appointments   Date Time Provider Saritha Ayala   1/16/2023  5:00 PM Alisha Keating PTA [facility]   1/18/2023  4:30 PM Alisha Keating PTA [facility]

## 2023-01-16 ENCOUNTER — APPOINTMENT (OUTPATIENT)
Dept: PHYSICAL THERAPY | Age: 53
End: 2023-01-16
Payer: COMMERCIAL

## 2023-01-16 ENCOUNTER — TELEPHONE (OUTPATIENT)
Dept: PHYSICAL THERAPY | Age: 53
End: 2023-01-16

## 2023-01-18 ENCOUNTER — HOSPITAL ENCOUNTER (OUTPATIENT)
Dept: PHYSICAL THERAPY | Age: 53
Discharge: HOME | End: 2023-01-18
Payer: COMMERCIAL

## 2023-01-18 PROCEDURE — 97112 NEUROMUSCULAR REEDUCATION: CPT

## 2023-01-18 PROCEDURE — 97110 THERAPEUTIC EXERCISES: CPT

## 2023-01-18 NOTE — PROGRESS NOTES
PT DAILY TREATMENT NOTE     Patient Name: Violetta Redd  Date:2023  : 1970  [x]  Patient  Verified  Payor: BLUE CROSS / Plan: [id number] PPO / Product Type: PPO /    In time:4:35  Out time:5:32  Total Treatment Time (min): 62  Visit #: 3 of 4-6    Medicare/BCBS Only   Total Timed Codes (min):  52 1:1 Treatment Time:  45       Treatment Area: Spinal stenosis, cervical region [M48.02]    SUBJECTIVE  Pain Level (0-10 scale): 2  Any medication changes, allergies to medications, adverse drug reactions, diagnosis change, or new procedure performed?: [x] No    [] Yes (see summary sheet for update)  Subjective functional status/changes:   [] No changes reported  Pt reports she still has some intermittent discomfort in her neck, especially noted at night. OBJECTIVE    Modality rationale: decrease pain and increase tissue extensibility to improve the patients ability to perform ADL's with ease.    Min Type Additional Details    [] Estim:  []Unatt       []IFC  []Premod                        []Other:  []w/ice   []w/heat  Position:  Location:    [] Estim: []Att    []TENS instruct  []NMES                    []Other:  []w/US   []w/ice   []w/heat  Position:  Location:    []  Traction: [] Cervical       []Lumbar                       [] Prone          []Supine                       []Intermittent   []Continuous Lbs:  [] before manual  [] after manual    []  Ultrasound: []Continuous   [] Pulsed                           []1MHz   []3MHz W/cm2:  Location:    []  Iontophoresis with dexamethasone         Location: [] Take home patch   [] In clinic   10 []  Ice     [x]  heat  []  Ice massage  []  Laser   []  Anodyne Position:supine w/wedge  Location: C/S    []  Laser with stim  []  Other:  Position:  Location:    []  Vasopneumatic Device    []  Right     []  Left  Pre-treatment girth:  Post-treatment girth:  Measured at (location):  Pressure:       [] lo [] med [] hi   Temperature: [] lo [] med [] hi   [x] Skin assessment post-treatment:  [x]intact []redness- no adverse reaction    []redness - adverse reaction:         34 min Therapeutic Exercise:  [x] See flow sheet :   Rationale: increase ROM, increase strength, and improve coordination to improve the patients ability to perform functional task with ease. 8 min Neuromuscular Re-education:  [x]  See flow sheet :   Rationale: increase strength, improve coordination, and increase proprioception  to improve the patients ability to perform ADL's with ease. 5 min Manual Therapy:  seated- STM/DTM to B UT/LS. Supine- SOR, Tpr to left UT. The manual therapy interventions were performed at a separate and distinct time from the therapeutic activities interventions. Rationale: decrease pain, increase ROM, increase tissue extensibility, and decrease trigger points to improve functional mobility with ADL's. With   [] TE   [] TA   [] neuro   [] other: Patient Education: [x] Review HEP    [] Progressed/Changed HEP based on:   [] positioning   [] body mechanics   [] transfers   [] heat/ice application    [] other:      Other Objective/Functional Measures:   Retractions on p. Ball-10x3\"     Pain Level (0-10 scale) post treatment: 0    ASSESSMENT/Changes in Function:   Continued Trp in the left UT that causes discomfort at the left UT and down into the vertebral border of the scap. The pt notes the pain is not as intense but is still uncomfortable, especially noted at night. Increased reps and resistance to some exercises to continue to improve c/s and scapular strength and stability.     Patient will continue to benefit from skilled PT services to modify and progress therapeutic interventions, address functional mobility deficits, address ROM deficits, address strength deficits, analyze and address soft tissue restrictions, analyze and cue movement patterns, analyze and modify body mechanics/ergonomics, and assess and modify postural abnormalities to attain remaining goals. [x]  See Plan of Care  []  See progress note/recertification  []  See Discharge Summary         Progress towards goals / Updated goals:  Updated Goals: to be achieved in 2-3 weeks:  1.) Patient  will be independent  with comprehensive updated HEP to continue to manage care independently after discharge. PN: progressing  HEP updated and reviewed and given to patient. current:   2.) Increase FOTO score to 51 indicating improved function and quality of life. PN: progressing  FOTO score 29  current:   3.) Patient to increase strength in bilat middle and lower trap to at least 3+/5 to facilitate improved scapular stability with functional lifting, carrying   PN: No change right mid/lower trap: 3+/3/5, left mid/lower trap: 3/3/5  current:    4.) Patient will report 75% improvement in neck and left UE symptoms with lifting, sleeping. PN: patient reports 40% improvement   current:     PLAN  []  Upgrade activities as tolerated     [x]  Continue plan of care  []  Update interventions per flow sheet       []  Discharge due to:_  []  Other:_      Gabriella Tavera, CHARLY 1/18/2023  4:36 PM    No future appointments.

## 2023-01-23 ENCOUNTER — HOSPITAL ENCOUNTER (OUTPATIENT)
Dept: PHYSICAL THERAPY | Age: 53
Discharge: HOME | End: 2023-01-23
Payer: COMMERCIAL

## 2023-01-23 PROCEDURE — 97110 THERAPEUTIC EXERCISES: CPT

## 2023-01-23 PROCEDURE — 97140 MANUAL THERAPY 1/> REGIONS: CPT

## 2023-01-23 NOTE — PROGRESS NOTES
PT DAILY TREATMENT NOTE     Patient Name: Sindy Colón  DWWM:  : 1970  [x]  Patient  Verified  Payor: BLUE CROSS / Plan: [id number] PPO / Product Type: PPO /    In time:5:03  Out time:5:55  Total Treatment Time (min): 52  Visit #: 4 of 4-6    Medicare/BCBS Only   Total Timed Codes (min):  42 1:1 Treatment Time:  37       Treatment Area: Spinal stenosis, cervical region [M48.02]    SUBJECTIVE  Pain Level (0-10 scale): 1  Any medication changes, allergies to medications, adverse drug reactions, diagnosis change, or new procedure performed?: [x] No    [] Yes (see summary sheet for update)  Subjective functional status/changes:   [] No changes reported  Pt reports her C/S is feeling pretty good for how windy and cold it is outside. OBJECTIVE    Modality rationale: decrease pain and increase tissue extensibility to improve the patients ability to perform ADL's.    Min Type Additional Details    [] Estim:  []Unatt       []IFC  []Premod                        []Other:  []w/ice   []w/heat  Position:  Location:    [] Estim: []Att    []TENS instruct  []NMES                    []Other:  []w/US   []w/ice   []w/heat  Position:  Location:    []  Traction: [] Cervical       []Lumbar                       [] Prone          []Supine                       []Intermittent   []Continuous Lbs:  [] before manual  [] after manual    []  Ultrasound: []Continuous   [] Pulsed                           []1MHz   []3MHz W/cm2:  Location:    []  Iontophoresis with dexamethasone         Location: [] Take home patch   [] In clinic   10 []  Ice     [x]  heat  []  Ice massage  []  Laser   []  Anodyne Position:supine w/wedge  Location: C/S    []  Laser with stim  []  Other:  Position:  Location:    []  Vasopneumatic Device    []  Right     []  Left  Pre-treatment girth:  Post-treatment girth:  Measured at (location):  Pressure:       [] lo [] med [] hi   Temperature: [] lo [] med [] hi   [x] Skin assessment post-treatment:  [x]intact []redness- no adverse reaction    []redness - adverse reaction:         26 min Therapeutic Exercise:  [x] See flow sheet :   Rationale: increase ROM, increase strength, and improve coordination to improve the patients ability to perform functional task with ease. 8 min Neuromuscular Re-education:  [x]  See flow sheet :   Rationale: increase strength, improve coordination, and increase proprioception  to improve the patients ability to perform ADL's.    8 min Manual Therapy:  seated- STM/DTM to B UT/LS. Supine- SOR, Tpr to left UT. The manual therapy interventions were performed at a separate and distinct time from the therapeutic activities interventions. Rationale: decrease pain, increase ROM, and increase tissue extensibility to improve functional mobility with ADL's. With   [] TE   [] TA   [] neuro   [] other: Patient Education: [x] Review HEP    [] Progressed/Changed HEP based on:   [] positioning   [] body mechanics   [] transfers   [] heat/ice application    [] other:      Other Objective/Functional Measures:      Pain Level (0-10 scale) post treatment: 0-1/10    ASSESSMENT/Changes in Function:   Moderate trigger point in the proximal left scap and tightness in the left UT that continues to cause pain and discomfort in the left C/S and shoulder. QP thread the needle for the left initiated with pt noting a good stretch. Pt is prepared to be discharged NV. Plan to update HEP to include stretches to the left scap. Patient will continue to benefit from skilled PT services to modify and progress therapeutic interventions, address functional mobility deficits, address ROM deficits, address strength deficits, analyze and address soft tissue restrictions, analyze and cue movement patterns, analyze and modify body mechanics/ergonomics, and assess and modify postural abnormalities to attain remaining goals.      [x]  See Plan of Care  []  See progress note/recertification  []  See Discharge Summary         Progress towards goals / Updated goals:  Updated Goals: to be achieved in 2-3 weeks:  1.) Patient  will be independent  with comprehensive updated HEP to continue to manage care independently after discharge. PN: progressing  HEP updated and reviewed and given to patient. current:   2.) Increase FOTO score to 51 indicating improved function and quality of life. PN: progressing  FOTO score 29  current:   3.) Patient to increase strength in bilat middle and lower trap to at least 3+/5 to facilitate improved scapular stability with functional lifting, carrying   PN: No change right mid/lower trap: 3+/3/5, left mid/lower trap: 3/3/5  current:    4.) Patient will report 75% improvement in neck and left UE symptoms with lifting, sleeping. PN: patient reports 40% improvement   current:     PLAN  []  Upgrade activities as tolerated     [x]  Continue plan of care  []  Update interventions per flow sheet       []  Discharge due to:_  []  Other:_      Padmini Baer, CHARLY 1/23/2023  5:18 PM    No future appointments.

## 2023-01-26 ENCOUNTER — HOSPITAL ENCOUNTER (OUTPATIENT)
Dept: PHYSICAL THERAPY | Age: 53
Discharge: HOME | End: 2023-01-26
Payer: COMMERCIAL

## 2023-01-26 PROCEDURE — 97110 THERAPEUTIC EXERCISES: CPT

## 2023-01-26 PROCEDURE — 97112 NEUROMUSCULAR REEDUCATION: CPT

## 2023-01-26 NOTE — PROGRESS NOTES
PT DAILY TREATMENT NOTE     Patient Name: Demian Bolaños  XCRB:[id number]  : 1970  [x]  Patient  Verified  Payor: BLUE CROSS / Plan: Leigh Ann Cheng [id number] PPO / Product Type: PPO /    In time:8:37  Out time:9:26  Total Treatment Time (min): 49  Visit #: 5 of 4-6    Medicare/BCBS Only   Total Timed Codes (min):  39 1:1 Treatment Time:  32       Treatment Area: Spinal stenosis, cervical region [M48.02]    SUBJECTIVE  Pain Level (0-10 scale): 2  Any medication changes, allergies to medications, adverse drug reactions, diagnosis change, or new procedure performed?: [x] No    [] Yes (see summary sheet for update)  Subjective functional status/changes:   [x] No changes reported    OBJECTIVE    Modality rationale: decrease pain and increase tissue extensibility to improve the patients ability to perform ADL's with ease.    Min Type Additional Details    [] Estim:  []Unatt       []IFC  []Premod                        []Other:  []w/ice   []w/heat  Position:  Location:    [] Estim: []Att    []TENS instruct  []NMES                    []Other:  []w/US   []w/ice   []w/heat  Position:  Location:    []  Traction: [] Cervical       []Lumbar                       [] Prone          []Supine                       []Intermittent   []Continuous Lbs:  [] before manual  [] after manual    []  Ultrasound: []Continuous   [] Pulsed                           []1MHz   []3MHz W/cm2:  Location:    []  Iontophoresis with dexamethasone         Location: [] Take home patch   [] In clinic   10 []  Ice     [x]  heat  []  Ice massage  []  Laser   []  Anodyne Position:supine w/wedge  Location: c/S, T/S    []  Laser with stim  []  Other:  Position:  Location:    []  Vasopneumatic Device    []  Right     []  Left  Pre-treatment girth:  Post-treatment girth:  Measured at (location):  Pressure:       [] lo [] med [] hi   Temperature: [] lo [] med [] hi   [x] Skin assessment post-treatment:  [x]intact []redness- no adverse reaction []redness - adverse reaction:         29 min Therapeutic Exercise:  [x] See flow sheet :   Rationale: increase ROM, increase strength, and improve coordination to improve the patients ability to perform ADL's with ease. 10 min Neuromuscular Re-education:  [x]  See flow sheet :   Rationale: increase strength, improve coordination, and increase proprioception  to improve the patients ability to perform ADL's with ease. With   [] TE   [] TA   [] neuro   [] other: Patient Education: [x] Review HEP    [] Progressed/Changed HEP based on:   [] positioning   [] body mechanics   [] transfers   [] heat/ice application    [] other:      Other Objective/Functional Measures: pt discharge to manage self care at home. Pain Level (0-10 scale) post treatment: 0    ASSESSMENT/Changes in Function:   The pt displayed fair progression with treatment to the C/S noting decreased pain and improved mobility. The pt was able to progress in strength in the mid and lower traps and notes an overall improvement of about 75%. The pt does continue to have a minor trp in the left proximal scap that gives her pain however therex was progressed to include T/S stretches and strengthening exercises. Although improved the pt continued to display some weakness in her mid and lower traps, especially noted on the left side. The pt is confident in her ability to continue to strengthen the mid and lower traps through her HEP. The pt was given an updated HEP and discharged to manage self care at home. Patient will continue to benefit from skilled PT services to modify and progress therapeutic interventions, address functional mobility deficits, address ROM deficits, address strength deficits, analyze and address soft tissue restrictions, analyze and cue movement patterns, analyze and modify body mechanics/ergonomics, and assess and modify postural abnormalities to attain remaining goals.      [x]  See Plan of Care  []  See progress note/recertification  []  See Discharge Summary         Progress towards goals / Updated goals:  Updated Goals: to be achieved in 2-3 weeks:  1.) Patient  will be independent  with comprehensive updated HEP to continue to manage care independently after discharge. PN: progressing  HEP updated and reviewed and given to patient. current: Met 1/26/23 HEP updated and reviewed for discharge  2.) Increase FOTO score to 51 indicating improved function and quality of life. PN: progressing  FOTO score 29  current: Progressing 1/26/23 FOTO score 46%  3.) Patient to increase strength in bilat middle and lower trap to at least 3+/5 to facilitate improved scapular stability with functional lifting, carrying   PN: No change right mid/lower trap: 3+/3/5, left mid/lower trap: 3/3/5  current: Progressing 1/26/23 Mid trap: left 4-/5, right 4/5, Lower trap: left 3+/5, Right 4-/5  4.) Patient will report 75% improvement in neck and left UE symptoms with lifting, sleeping. PN: patient reports 40% improvement   current: Met 1/26/23 75% improvement since [facility]. PLAN  []  Upgrade activities as tolerated     []  Continue plan of care  []  Update interventions per flow sheet       [x]  Discharge due to:_  []  Other:_      Zay Du PTA 1/26/2023  8:43 AM    No future appointments.

## 2023-01-26 NOTE — PROGRESS NOTES
[facility]  [address] [facility] [address]  [place], [address]  [phone] fax    Physical Therapy Discharge Summary  Patient name: Stacia De León Start of Care: 2022   Referral source: Alen Mensah MD : 1970   Medical/Treatment Diagnosis: Spinal stenosis, cervical region [M48.02]  Payor: BLUE [place] / Plan: [facility] PPO / Product Type: PPO /  Onset Date:10/30/22                 Prior Hospitalization: see medical history Provider#: [id number]   Medications: Verified on Patient Summary List    Comorbidities: HBP and scoliosis    Prior Level of Function: History of neck pain. Pt is right handed. Functionally indep. 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 Visits from Start of Care: 11                                     Missed Visits: 3  Reporting Period : 23 to 23      Progress towards goals / Updated goals:  Updated Goals:   1.) Patient  will be independent  with comprehensive updated HEP to continue to manage care independently after discharge. PN: progressing  HEP updated and reviewed and given to patient. DC: Met HEP updated and reviewed for discharge  2.) Increase FOTO score to 51 indicating improved function and quality of life. PN: progressing  FOTO score 29  DC: Progressing FOTO score 46%  3.) Patient to increase strength in bilat middle and lower trap to at least 3+/5 to facilitate improved scapular stability with functional lifting, carrying   PN: No change right mid/lower trap: 3+/3/5, left mid/lower trap: 3/3/5  DC: Progressing Mid trap: left 4-/5, right 4/5, Lower trap: left 3+/5, Right 4-/5  4.) Patient will report 75% improvement in neck and left UE symptoms with lifting, sleeping. PN: patient reports 40% improvement   DC: Met 75% improvement since [facility]. ASSESSMENT/RECOMMENDATIONS:  The pt displayed fair progression with treatment to the C/S noting decreased pain and improved mobility.  The pt was able to progress in strength in the mid and lower traps and notes an overall improvement of about 75%. The pt does continue to have a minor trp in the left proximal scap that gives her pain however therex was progressed to include T/S stretches and strengthening exercises. Although improved the pt continued to display some weakness in her mid and lower traps, especially noted on the left side. The pt is confident in her ability to continue to strengthen the mid and lower traps through her HEP. The pt was given an updated HEP and discharged to manage self care at home.     [x]Discontinue therapy: [x]Patient has reached or is progressing toward set goals      []Patient is non-compliant or has abdicated      []Due to lack of appreciable progress towards set Taqueria Alexander, PTA 1/26/2023 3:49 PM  Cuauhtemoc Davalos, PT 1/27/23 9:08 AM

## 2024-07-18 ENCOUNTER — OFFICE VISIT (OUTPATIENT)
Age: 54
End: 2024-07-18
Payer: COMMERCIAL

## 2024-07-18 VITALS
SYSTOLIC BLOOD PRESSURE: 136 MMHG | OXYGEN SATURATION: 100 % | DIASTOLIC BLOOD PRESSURE: 70 MMHG | WEIGHT: 161.6 LBS | BODY MASS INDEX: 28.63 KG/M2 | HEART RATE: 68 BPM | TEMPERATURE: 97 F | HEIGHT: 63 IN

## 2024-07-18 DIAGNOSIS — R00.2 PALPITATIONS: Primary | ICD-10-CM

## 2024-07-18 DIAGNOSIS — I05.9 MITRAL VALVE DISEASE: ICD-10-CM

## 2024-07-18 DIAGNOSIS — R07.89 OTHER CHEST PAIN: ICD-10-CM

## 2024-07-18 DIAGNOSIS — R06.02 EXERTIONAL SHORTNESS OF BREATH: ICD-10-CM

## 2024-07-18 DIAGNOSIS — R01.1 SYSTOLIC MURMUR: ICD-10-CM

## 2024-07-18 DIAGNOSIS — R07.9 CHEST PAIN, UNSPECIFIED TYPE: ICD-10-CM

## 2024-07-18 DIAGNOSIS — Z82.49 FAMILY HISTORY OF ISCHEMIC HEART DISEASE (IHD): ICD-10-CM

## 2024-07-18 PROBLEM — F41.1 GENERALIZED ANXIETY DISORDER: Status: ACTIVE | Noted: 2024-07-18

## 2024-07-18 PROBLEM — I10 HYPERTENSION: Status: ACTIVE | Noted: 2021-03-16

## 2024-07-18 PROBLEM — I34.1 MITRAL VALVE PROLAPSE: Status: ACTIVE | Noted: 2024-07-18

## 2024-07-18 PROBLEM — M48.02 CERVICAL STENOSIS OF SPINE: Status: ACTIVE | Noted: 2024-07-18

## 2024-07-18 PROCEDURE — 3078F DIAST BP <80 MM HG: CPT | Performed by: INTERNAL MEDICINE

## 2024-07-18 PROCEDURE — 3075F SYST BP GE 130 - 139MM HG: CPT | Performed by: INTERNAL MEDICINE

## 2024-07-18 PROCEDURE — 99215 OFFICE O/P EST HI 40 MIN: CPT | Performed by: INTERNAL MEDICINE

## 2024-07-18 RX ORDER — TRAMADOL HYDROCHLORIDE 50 MG/1
TABLET ORAL
COMMUNITY

## 2024-07-18 RX ORDER — OMEPRAZOLE 20 MG/1
CAPSULE, DELAYED RELEASE ORAL
COMMUNITY
Start: 2024-02-09

## 2024-07-18 RX ORDER — DILTIAZEM HYDROCHLORIDE 120 MG/1
CAPSULE, COATED, EXTENDED RELEASE ORAL
COMMUNITY
Start: 2024-05-23

## 2024-07-18 RX ORDER — METOPROLOL SUCCINATE 25 MG/1
TABLET, EXTENDED RELEASE ORAL
COMMUNITY

## 2024-07-18 RX ORDER — BUPROPION HYDROCHLORIDE 150 MG/1
150 TABLET ORAL DAILY
COMMUNITY
Start: 2024-05-07

## 2024-07-18 RX ORDER — LORAZEPAM 0.5 MG/1
0.5 TABLET ORAL 3 TIMES DAILY PRN
COMMUNITY
Start: 2024-02-15

## 2024-07-18 RX ORDER — NITROFURANTOIN 25; 75 MG/1; MG/1
CAPSULE ORAL
COMMUNITY
Start: 2024-05-09

## 2024-07-18 RX ORDER — AMOXICILLIN AND CLAVULANATE POTASSIUM 875; 125 MG/1; MG/1
TABLET, FILM COATED ORAL
COMMUNITY
Start: 2024-05-09

## 2024-07-18 RX ORDER — DILTIAZEM HYDROCHLORIDE 120 MG/1
CAPSULE, EXTENDED RELEASE ORAL
COMMUNITY
Start: 2021-04-15

## 2024-07-18 RX ORDER — CYCLOBENZAPRINE HCL 10 MG
TABLET ORAL
COMMUNITY

## 2024-07-18 RX ORDER — CHLORAL HYDRATE 500 MG
CAPSULE ORAL
COMMUNITY

## 2024-07-18 ASSESSMENT — ENCOUNTER SYMPTOMS
ALLERGIC/IMMUNOLOGIC NEGATIVE: 1
SHORTNESS OF BREATH: 1
GASTROINTESTINAL NEGATIVE: 1
EYES NEGATIVE: 1

## 2024-07-18 ASSESSMENT — PATIENT HEALTH QUESTIONNAIRE - PHQ9
1. LITTLE INTEREST OR PLEASURE IN DOING THINGS: NOT AT ALL
SUM OF ALL RESPONSES TO PHQ9 QUESTIONS 1 & 2: 0
SUM OF ALL RESPONSES TO PHQ QUESTIONS 1-9: 0
2. FEELING DOWN, DEPRESSED OR HOPELESS: NOT AT ALL
SUM OF ALL RESPONSES TO PHQ QUESTIONS 1-9: 0

## 2024-07-18 NOTE — PROGRESS NOTES
1. \"Have you been to the ER, urgent care clinic since your last visit?  Hospitalized since your last visit?\" Reviewed by Dr. Wilman Judge    2. \"Have you seen or consulted any other health care providers outside of the [facility] since your last visit?\" Reviewed by Dr. Wilman Judge    
reviewed.   Constitutional:       Appearance: Normal appearance.   HENT:      Head: Normocephalic and atraumatic.      Right Ear: External ear normal.      Left Ear: External ear normal.      Nose: Nose normal.      Mouth/Throat:      Mouth: Mucous membranes are dry.      Pharynx: Oropharynx is clear.   Eyes:      Extraocular Movements: Extraocular movements intact.      Conjunctiva/sclera: Conjunctivae normal.      Pupils: Pupils are equal, round, and reactive to light.   Neck:      Thyroid: No thyroid mass.      Vascular: No carotid bruit or JVD.      Trachea: Trachea normal.   Cardiovascular:      Rate and Rhythm: Normal rate and regular rhythm.      Pulses:           Carotid pulses are 1+ on the right side and 1+ on the left side.       Radial pulses are 1+ on the right side and 1+ on the left side.        Femoral pulses are 1+ on the right side and 1+ on the left side.       Popliteal pulses are 1+ on the right side and 1+ on the left side.        Dorsalis pedis pulses are 1+ on the right side and 1+ on the left side.        Posterior tibial pulses are 1+ on the right side and 1+ on the left side.      Heart sounds: S1 normal and S2 normal. Murmur heard.      Crescendo decrescendo systolic murmur is present with a grade of 1/6.      No gallop. No S4 sounds.   Pulmonary:      Effort: Pulmonary effort is normal.      Breath sounds: Normal breath sounds.   Abdominal:      General: Abdomen is flat. Bowel sounds are normal.      Palpations: Abdomen is soft.   Musculoskeletal:         General: Normal range of motion.      Cervical back: Normal range of motion and neck supple.      Right lower leg: No edema.      Left lower leg: No edema.   Lymphadenopathy:      Cervical: No cervical adenopathy.   Skin:     General: Skin is warm and dry.      Capillary Refill: Capillary refill takes 2 to 3 seconds.   Neurological:      General: No focal deficit present.      Mental Status: She is alert and oriented to person, place,

## 2025-07-18 ENCOUNTER — OFFICE VISIT (OUTPATIENT)
Age: 55
End: 2025-07-18
Payer: COMMERCIAL

## 2025-07-18 VITALS
BODY MASS INDEX: 29.59 KG/M2 | HEART RATE: 96 BPM | OXYGEN SATURATION: 99 % | WEIGHT: 167 LBS | DIASTOLIC BLOOD PRESSURE: 64 MMHG | HEIGHT: 63 IN | SYSTOLIC BLOOD PRESSURE: 130 MMHG

## 2025-07-18 DIAGNOSIS — R07.89 OTHER CHEST PAIN: ICD-10-CM

## 2025-07-18 DIAGNOSIS — R01.1 SYSTOLIC MURMUR: ICD-10-CM

## 2025-07-18 DIAGNOSIS — R06.02 EXERTIONAL SHORTNESS OF BREATH: ICD-10-CM

## 2025-07-18 DIAGNOSIS — R00.2 PALPITATIONS: Primary | ICD-10-CM

## 2025-07-18 DIAGNOSIS — Z82.49 FAMILY HISTORY OF ISCHEMIC HEART DISEASE (IHD): ICD-10-CM

## 2025-07-18 PROCEDURE — 3075F SYST BP GE 130 - 139MM HG: CPT | Performed by: INTERNAL MEDICINE

## 2025-07-18 PROCEDURE — 99215 OFFICE O/P EST HI 40 MIN: CPT | Performed by: INTERNAL MEDICINE

## 2025-07-18 PROCEDURE — 3078F DIAST BP <80 MM HG: CPT | Performed by: INTERNAL MEDICINE

## 2025-07-18 RX ORDER — ASPIRIN 81 MG/1
81 TABLET ORAL DAILY
COMMUNITY

## 2025-07-18 ASSESSMENT — ENCOUNTER SYMPTOMS
EYES NEGATIVE: 1
GASTROINTESTINAL NEGATIVE: 1
SHORTNESS OF BREATH: 1
ALLERGIC/IMMUNOLOGIC NEGATIVE: 1

## 2025-07-18 ASSESSMENT — PATIENT HEALTH QUESTIONNAIRE - PHQ9
SUM OF ALL RESPONSES TO PHQ QUESTIONS 1-9: 0
2. FEELING DOWN, DEPRESSED OR HOPELESS: NOT AT ALL

## 2025-07-18 NOTE — PROGRESS NOTES
1. \"Have you been to the ER, urgent care clinic since your last visit?  Hospitalized since your last visit?\" Reviewed by Dr. Wilman Judge     2. \"Have you seen or consulted any other health care providers outside of the [facility] since your last visit?\" Reviewed by Dr. Wilman Judge

## 2025-07-18 NOTE — PROGRESS NOTES
Yovanny Dumont (:  1970) is a 54 y.o. female,Established patient, here for evaluation of the following chief complaint(s):  Follow-up (1 yr f/u)      Subjective   SUBJECTIVE/OBJECTIVE:    History of Present Illness  Patient presents today for follow up. She has a history of what she thought was a pure musculoskeletal problem. She states she was bending over and developed pain in her left shoulder and neck while ironing and picking up a piece of clothing off the floor one morning. She states that it did not improve over time and she went to see her primary care physician who ordered medications which only minimally assisted. She states that the symptoms dissipated but came back quickly.     She reports experiencing episodes of rapid heartbeat, which first occurred in late 2024. During one such episode, she sought emergency medical assistance, but her heart rate had normalized by the time the paramedics arrived. These episodes subsided for a period but resumed intermittently in 2025. She has undergone heart monitoring, but the infrequency of these episodes has made it challenging to capture one. Her current medication regimen includes Cardizem 180 mg, which she believes is a beta blocker used to regulate her heart rate. She occasionally experiences a low heart rate of around 58 when checking her blood pressure at home. She also mentions that her blood pressure readings can be as low as 115/60 at home but can spike to 180 or 190 during an ER visit, which she attributes to stress and anxiety. She uses a device to help manage her anxiety. She has been under significant stress following the loss of her  on 2023 and has had to cancel planned vacations due to her health concerns.    She describes an incident where she was feeling well while walking with a colleague but suddenly experienced severe dizziness, akin to a skipped heartbeat. She is unsure if this was related to a